# Patient Record
(demographics unavailable — no encounter records)

---

## 2024-10-09 NOTE — DATA REVIEWED
[FreeTextEntry1] : CT of head 09/11/2024 SB CT cervical spine 09/11/2024 SB  CT Thoracic and lumbar spine 09/11/2024 SB MRI of the cervical spine 06/12/2024 [facility] MRI of the thoracic spine 06/12/2024 [facility] MRI of the lumbar spine 06/12/2024 [facility] MRI of the brain pre and post IV contrast 06/12/2024 [facility]

## 2024-10-09 NOTE — END OF VISIT
[FreeTextEntry3] :  The following information has been documented by Parvin Pavon acting as a scribe. The documentation recorded by the scribe, in my presence, accurately reflects the service I, Dr. Stevenson, personally performed, and the decisions made by me with my edits as appropriate.  This note was generated by using Dragon medical dictation software. A reasonable effort has been made for proofreading its contents, but typos may still remain. If there are any questions or points of clarification needed, please notify my office.

## 2024-10-09 NOTE — PHYSICAL EXAM
[Normal] : Heart rate was normal and rhythm regular, normal S1 and S2, no gallops, no murmurs and no pericardial rub [FreeTextEntry1] : EXAMINATION OF THE CERVICAL SPINE AND UPPER EXTREMITIES: Pt is aware and alert and answered all questions cooperatively.  Cranial nerve testing was intact.   Smell and taste were not tested.   Visual fields were full.   There was no difficulty with finger to nose response.   Rhomberg testing was negative.   There was no dysmetria of the upper extremities.  Reflexes revealed 2's and symmetrical    Manual muscle testing of the right upper extremity revealed  Sensory examination revealed sensation was intact.  Vibratory and proprioceptive testing were intact.   Peripheral pulses were palpable bilaterally.   Francisco Test was negative.   Tinels Test was negative at the wrists bilaterally.   The Spurling Cervical Compression Test was negative.   The Adsons Maneuver was negative bilaterally.   No scapular winging was noted.   There was good scapular mobility.    Range of motion testing was performed with the use of a goniometer.  On range of motion testing, flexion was to 40 degrees  (normal - 45), extension was to 10 degrees (normal - 55), right rotation was to 40 degrees  (normal - 70), left rotation was to 35 degrees (normal - 70), right lateral bending was to 15 degrees (normal - 40), and left lateral bending was to 10 degrees (normal - 40).  On palpation, of the cervical spine there was tenderness involving numerous cervical spinous processes. Tenderness and spasm involving the bilateral cervical paraspinal musculature. Tenderness involving the bilateral occiputs. Trigger points involving the bilateral trapezii musculature.   EXAMINATION OF LUMBAR SPINE & LOWER EXTREMITIES: Upon inspection: there is a mild exaggeration of the thoracic kyphosis. Straightening of the lumbar lordosis.   Pt ambulates with rollator walker   Reflexes (R) L/E:                   Quadriceps 2                   Achilles 2 Reflexes (L) L/E:                    Quadriceps 2                    Achilles 2    Sensory L/E: decrease sensation of the stocking distribution of the bilateral feet      Good peripheral Pulses Bilateral L/E   Testing: Patricks (R) [- ]               Patricks (L) [ -]               Babinski [ down going bilaterally]               Chaddock [ -bilaterally]               Oppenheim [ -bilaterally]               Gonda [- bilaterally]               Clonus [- ankle bilaterally]               Leseagues (R) [- ]               Leseagues (L) [ -]               Kemps [ -] SI Jt Lig.Challenege Test (R) + SI Jt Lig.Challenege Test (L) +  S.L.R. ( R ) + 40 degrees S.L.R. ( L ) + 40 degrees Range of motion testing was performed with the use of a goniometer.                           Flexion: 55 degrees (normal - 75-90)                           Extension: 10 degrees (normal - 30)                           Lateral Bending ( R ): 25 degrees  (normal - 35)                           Lateral Bending ( L ):20 degrees (normal - 35)                           Thoracic Rotation ( R ):25 degrees (normal - 35)                           Thoracic Rotation ( L ): 30 degrees (normal - 35)                           Internal Rotation Femur ( R ): mild restriction                            External Rotation Femur ( R ):WNL                           Internal Rotation Femur ( L ):mild restriction                            External Rotation Femur ( L ): WNL  Vibratory: intact left lower extremity. Difficulty involving the lower extremity  Proprioception: intact left lower extremity. Difficulty involving the right lower extremity.  MMT: grossly intact  Palpation of the Lumbar Spine: Tenderness involving the L4/L5 and L5/S1 interspace. Tenderness involving the bilateral SI JT'S. Tenderness and spasm involving the bilateral lower lumbar paraspinal musculature. Trigger points involving the bilateral gluteal musculature.

## 2024-10-09 NOTE — ASSESSMENT
[FreeTextEntry1] : Acupuncture 10 Sessions to C/S & L/S  Goals of which are to decrease pain, dissipate muscle spasm, increase ROM and improve level of function. Home exercise plan reviewed with patient. Stressed the importance for the need for frequent change in position from sit to stand and stand to sit, as well as use of weight shifting techniques to alleviate low back discomfort. Instruction in proper posturing, body mechanics and lifting techniques. Follow up with Neurologist for peripheral neuropathy  Follow up with EDYTA Bryant in 4 to 6 weeks  At least 60 minutes was spent with patient face to face examining patient, reviewing findings/results, counseling patient and coordinating treatment program. Ample time was provided to answer any questions or address concerns to the patient's satisfaction.

## 2024-10-24 NOTE — HISTORY OF PRESENT ILLNESS
[Gradual] : gradual [Localized] : localized [Nothing helps with pain getting better] : Nothing helps with pain getting better [5] : 5 [Burning] : burning [Tingling] : tingling [de-identified] : Patient presents for follow up of her right ankle arthritis. She reports progressive right ankle pain over several years.  She is s/p orif of a trimalleolar fracture in 1994.  She had her hardware removed shortly after. She ambulates with a walker due to peripheral neuropathy. Steroid injection right ankle joint 8/8/24 has helped. Mobic 15 mg helpful. She uses an ankle brace prn. She reports her neuropathy has been getting worse. Has been using OTC lidocaine and has increased her cymbalta.  [] : no [FreeTextEntry1] : rt ankle [FreeTextEntry5] : pain for 6 weeks nut did have tri mall 30 years ago and still has permenant numbness [FreeTextEntry6] : redness,numbness  [FreeTextEntry9] : meloxicam

## 2024-10-24 NOTE — DISCUSSION/SUMMARY
[de-identified] : Pt. is doing well in regard to her ankle.   WB in supportive footwear. Ice to affected area.  Discussed role and timing of steroid injection/gel injections (visco-3).   She will follow up with neurology in regard to neuropathy.

## 2024-10-24 NOTE — PHYSICAL EXAM
[NL 30)] : inversion 30 degrees [5___] : eversion 5[unfilled]/5 [2+] : posterior tibialis pulse: 2+ [Right] : right ankle [Weight -] : weightbearing [FreeTextEntry9] : Moderate djd tibiotalar joint.  No significant deformity. [NL (40)] : plantar flexion 40 degrees [] : no pain when stressing lateral tarsal metatarsal joint [de-identified] : Absence of sensation forefoot.  [TWNoteComboBox7] : dorsiflexion 15 degrees [de-identified] : eversion 10 degrees

## 2024-10-29 NOTE — REASON FOR VISIT
[Follow-Up Visit] : a follow-up pain management visit [Family Member] : family member [Other: _____] : [unfilled] [FreeTextEntry2] : Low back/bilateral leg pain

## 2024-10-29 NOTE — HISTORY OF PRESENT ILLNESS
[Lower back] : lower back [8] : 8 [7] : 7 [Burning] : burning [Sharp] : sharp [Tingling] : tingling [Constant] : constant [Household chores] : household chores [Leisure] : leisure [Sleep] : sleep [Meds] : meds [Standing] : standing [Retired] : Work status: retired [FreeTextEntry1] : 10/25/2024 - Patient presents for a FUV after a bilateral occipital nerve block with U/S guidance on 2024. Patient reports mild improvement in her headaches at this time however, she has been addressing her cervical spine and finds that has been contributing to her relief.  She was seen by neurology who determined the HAs were generating from her neck and she now has been in physical therapy 3 times/week for the past 14 weeks and will be initiating acupuncture in the next few weeks as well. Of note, patient established care with a new cardiologist and is now on a new medication regimen and reports better controlled hypertension.  Today. her most bothersome symptom is her low back pain with radiation to bilateral lower extremities. She reports burning, numbness and tingling to bilateral feet. Presents today to discuss next steps in her treatment plan for her low back pain with the hope of decreasing her pain and increasing her functionality and quality of life.   2024 - Patient presents for FUV after a bilateral occipital nerve block on 2024. Patient reports a significant reduction in the frequency and intensity of her headaches for about 3-4 days s/p occipital block.  Unfortunately she experienced a return of symptoms essentially to baseline.  She does continue to have headaches from which she thinks could possibly related to uncontrolled hypertension.  She has been seeing cardiology and will follow up with neurology.  2024 - Patient presents for FUV after a L5-S1 LESI on 2024. Patient reports moderate reduction of her lower back and right leg pain following the epidural.  Unfortunately, patient was seen in the ER 3 days post procedure for what she describes as severe hypertension and ongoing headache.  She was evaluated and discharged home.  Patient has been dealing with headaches for quite some time now without definitive source.  Has seen her longtime neurologist Dr. Kellerman and recently had an evaluation with Dr. Salmon ([facility]) who ordered repeat imaging of the C/T/L spine as well as brain.  Headache severity is greatest at the bilateral occiput with radiation to the top of her head.  2024 - Patient presents for FUV regarding their lower back pain.  She was last seen approximately 6 months ago.  Patient reports sustained relief following her previous lumbar epidural steroid injection which lasted until approximately 1 month ago.  Now has a return of lower back with radiation down the posterior right leg stopping at the posterior calf.  Additionally, she reports a flareup of her peripheral neuropathy as per her treating neurologist.  A course of intravenous corticosteroid was suggested and completed about 5 weeks ago.  Takes Cymbalta 60 mg and Tylenol daily with no benefit; she has discontinued Lyrica due to no perceived benefit.  Of note she is undergoing further neurological workup and will be undergoing a spinal tap this week.  2023 - Patient presents for FUV after a L5-S1 LESI on 2023. Patient reports a complete resolution of pain s/p injection, she is pleased with the results of the epidural; she continues to take 100 mg of Lyrica BID.   10/26/2023 - Patient presents for FUV regarding their lower back pain.   Patient reports radiation to the right lower extremity extending to the foot.  Currently has titrated to a dose of Lyrica 200 mg daily which has reduced some of the "neuropathy sensations". She also complains of additional multifocal joint pain; greatest of which is in her right knee.  She sees Dr. Nguyen for this.  2023- Patient presents for FUV regarding their lower back pain. Patient had started taking Tramadol and felt sick with nausea and sweating days after using, she has since discontinued use; she no longer gets IV steroid infusions as well.  Still with complaints in the low back with radiation to the right lower extremity extending to the ankle/foot.  Worst pain is concentrated in the right lateral aspect of the calf. Greatest degree of difficulty with ambulation.  2023 - Patient presents for FUV regarding their lower back pain.  Patient did not start the [place]  financial constraints.  She is following with a neurologist for IV steroid infusions for tx of peripheral neuropathy.  Patient reports she gets right sided arm numbness/tingling and right foot paranesthesia's.  She is becoming frustrated by her multitude of pain issues.  She is dealing with concurrent neck, low back, knee, and neurological issues.  2023 - Patient presents to review lumbar spine MRI from 2023. The majority of the patients pain is in the right foot, and occasionally the first digit on the left foot, there is no pain in the rest of the lower extremities, she has back pain but it is not as prominent as the right foot pain.  Has seen neurology in the past and obtained EMG.  Planning for follow-up in upcoming week.  2023 - Patient presents for FUV after a L5-S1 LESI on 2023.  Patient reports overall 50% relief sustained following the injection.  Patient reports her pain returned to baseline after about 2 weeks. She complains of primarily axial lower back pain specifically with prolonged standing.  2023 - Patient presents for FUV after a right PM C7-T1 DARRELL on 2023. Patient reports significant pain reduction following the injection (60% reduction in pain).  She is pleased with her response.  Patient's main complaint today is her axial lower back pain that has radiation to the medial right thigh, she had taken a trip to [place] recently for a week where she did extensive walking and exacerbated her back pain; she can endure 5-10 minutes of standing before it becomes unbearable.  Has had previous neurosurgical evaluations and surgery was recommended however she defers.  2023 - Patient presents for reevaluation.  She was last seen approximately 6 months ago.  Patient to the office regarding her neck pain. Patient was referred by Dr. Arevalo. Patient was originally seen for her back in September. Patient was placed on MDP for her right upper extremity radicular pain with good relief. Patient complains of right rotation stiffness in regards to her neck. Patient here for possible interventional management of cervical region ridiculous.  Taking dual action Advil once sometimes twice daily.  2022 - The patient presents for initial evaluation regarding their low back pain. Patient was referred by Dr. Nguyen. Patient sustained a slip and fall in 2021 and landed on her buttocks. Significant flare-up in her lower back since fall.  Experiences numbness in her toes and fingertips get tingling sensation when she closes her fists. Patient has gotten trigger point injections with no relief. Leg symptoms improve when seated. Diclofenac provides relief, previously tried Celebrex and Gabapentin - no relief.   Injections: 1) C7-T1 DARRELL (2023) 2) L5-S1 LESI (2023, 2023, 2024) 3) Bilateral occipital nerve block with U/S guidance - (2024, 2024)   Pertinent Surgical History: N/A   Imagin) MRI Lumbar Spine (2024) - [facility]  2) MRI Thoracic Spine (2024) - [facility]  3) MRI Cervical Spine (2024)- [facility]  Electrodiagnostic Testin) Lower Extremity NCV/EMG - (2022) - [facility]  1. 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 left peroneal motor and the right peroneal motor nerves showed reduced amplitude and decreased conduction velocity. The left tibial motor nerve showed decreased conduction velocity. The right tibial motor nerve showed reduced amplitude. The left sural sensory and right sural sensory nerves were unremarkable.  3) Upper Extremity NCV/EMG - (22) - [facility]  1. very mild bilateral median nerve lesions at the wrists 2. very mild bilateral ulnar nerve lesion at the elbows.   Physician Disclaimer: I have personally reviewed and confirmed all HPI data with the patient.  [] : Post Surgical Visit: no [FreeTextEntry7] : B/L LEGS [FreeTextEntry6] : NUMB [FreeTextEntry9] : Meloxicam  [de-identified] : in one spot  [de-identified] : L MANJINDER C MRI AT [facility]

## 2024-10-29 NOTE — PHYSICAL EXAM
[de-identified] : Constitutional:   - No acute distress   - Obese    Neurological:   - normal mood and affect   - alert and oriented x 3       Cardiovascular:   - grossly normal  Lumbar Spine Exam:  Inspection: erythema (-) ecchymosis (-) rashes (-) alignment: no scoliosis  Palpation: paraspinal tenderness:                    L (-) ; R (-) thoracic paraspinal tenderness:       L (-) ; R (-) sciatic nerve tenderness :                L (-) ; R (-) SI joint tenderness:                         L (-) ; R (-) GTB tenderness:                             L (-); R (-)  ROM: WNL with mild stiffness Pain with extremes of extension and flexion  Strength: Right/Left Hip Flexion: (5/5) (5/5) Quadriceps: (5/5) (5/5) Hamstrings: (5/5) (5/5) Ankle Dorsiflexion: (5/5) (5/5) EHL: (5/5) (5/5) Ankle Plantarflexion: (5/5) (5/5)  Special Tests: SLR:                 R (=) ; L (=) Facet loading:  R (+) ; L (+) WILLIAN test:     R (n/a) ; L (n/a) Tight Hamstrings R (+) ; L (+)  Neurologic: Light touch intact throughout LE  Reflexes normal and symmetric  Gait: antalgic gait, unsteady  ambulates with assistance of walker  ------------------------------------------------------------------------------ Cervical Spine Exam:   Inspection:   erythema (-)   ecchymosis (-)   rashes (-)    Palpation:                                                    Cervical paraspinal tenderness:         R (+); L(+)  Upper trapezius tenderness:              R (-); L (-)  Rhomboids tenderness:                      R (-); L (-)  Occipital Ridge:                                   R (+); L (+)  Supraspinatus tenderness:                 R (-); L (-)   ROM:   Reduced ROM all planes; crepitus throughout ROM testing pain throughout range of motion testing  Strength Testing:              Deltoid                           R (5/5); L (5/5)  Biceps:                          R (5/5); L (5/5)  Triceps:                         R (5/5); L (5/5)  Finger Abductors:         R (5/5); L (5/5)  Grasp:                           R (5/5); L (5/5)   Special Testing:  Spurling Test:                  R (-); L (-)  Facet load test:               R (+); L (+)   Neuro:  SILT throughout right upper extremity  SILT throughout left upper extremity   Reflexes:  Biceps   -           R (2+); L (2+)  Triceps  -           R (2+); L (2+)  Brachioradialis- R (2+); L (2+)     No ankle clonus

## 2024-10-29 NOTE — ASSESSMENT
[FreeTextEntry1] : A discussion regarding available pain management treatment options occurred with the patient. These included interventional, rehabilitative, pharmacological, and alternative modalities. We will proceed with the following:  Interventional treatment options: - Proceed with L5-S1 LESI (80 mg Kenalog) with fluoroscopic guidance - Patient may remain on aspirin for indicated procedure - Previously discussed expectations of interventional pain therapies in the setting of multilevel central canal stenosis - see additional instructions below  Rehabilitative options: - continue physical therapy for cervical spine - Failed physical therapy trial in the past regarding lumbar spine; she defers further - encouraged participation in active HEP as tolerated  Medication based treatment options: - continue Tylenol 500-1000 mg up to TID on PRN basis - Continue meloxicam 7.5-15 mg daily as needed; counseled patient to discuss consistent NSAID use with her cardiologist - Continue Cymbalta 90 mg daily, prescribed by neurology - Failed previous trials of Lyrica and gabapentin - see additional instructions below  Complementary treatment options: - Weight management and lifestyle modifications discussed  Additional treatment recommendations as follows: - Previously discussed orthopedic evaluation for lumbar decompression; she defers given caretaker responsibilities for her  - Follow-up with neurology as directed - Follow up 1-2 weeks post injection for assessment of efficacy and further treatment recommendations  The risks, benefits and alternatives of the proposed procedure were explained in detail with the patient. The risks outlined include but are not limited to infection, bleeding, nerve injury, a temporary increase in pain, failure to resolve symptoms, need for future interventions, allergic reaction, and possible elevation of blood sugar in diabetics if using corticosteroid.  All questions were answered to patient's apparent satisfaction, and he/she verbalized an understanding.  We have discussed the risks, benefits, and alternatives NSAID therapy including but not limited to the risk of bleeding, thrombosis, gastric mucosal irritation/ulceration, allergic reaction and kidney dysfunction; the patient verbalizes an understanding.  ERMA, Deepika Ponce NP, acting as scribe, attest that this documentation has been prepared under the direction and in the presence of Provider Alverto Barney DO.    The documentation recorded by the scribe, in my presence, accurately reflects the service I personally performed, and the decisions made by me with my edits as appropriate.

## 2024-10-29 NOTE — PHYSICAL EXAM
[de-identified] : Constitutional:   - No acute distress   - Obese    Neurological:   - normal mood and affect   - alert and oriented x 3       Cardiovascular:   - grossly normal  Lumbar Spine Exam:  Inspection: erythema (-) ecchymosis (-) rashes (-) alignment: no scoliosis  Palpation: paraspinal tenderness:                    L (-) ; R (-) thoracic paraspinal tenderness:       L (-) ; R (-) sciatic nerve tenderness :                L (-) ; R (-) SI joint tenderness:                         L (-) ; R (-) GTB tenderness:                             L (-); R (-)  ROM: WNL with mild stiffness Pain with extremes of extension and flexion  Strength: Right/Left Hip Flexion: (5/5) (5/5) Quadriceps: (5/5) (5/5) Hamstrings: (5/5) (5/5) Ankle Dorsiflexion: (5/5) (5/5) EHL: (5/5) (5/5) Ankle Plantarflexion: (5/5) (5/5)  Special Tests: SLR:                 R (=) ; L (=) Facet loading:  R (+) ; L (+) WILLIAN test:     R (n/a) ; L (n/a) Tight Hamstrings R (+) ; L (+)  Neurologic: Light touch intact throughout LE  Reflexes normal and symmetric  Gait: antalgic gait, unsteady  ambulates with assistance of walker  ------------------------------------------------------------------------------ Cervical Spine Exam:   Inspection:   erythema (-)   ecchymosis (-)   rashes (-)    Palpation:                                                    Cervical paraspinal tenderness:         R (+); L(+)  Upper trapezius tenderness:              R (-); L (-)  Rhomboids tenderness:                      R (-); L (-)  Occipital Ridge:                                   R (+); L (+)  Supraspinatus tenderness:                 R (-); L (-)   ROM:   Reduced ROM all planes; crepitus throughout ROM testing pain throughout range of motion testing  Strength Testing:              Deltoid                           R (5/5); L (5/5)  Biceps:                          R (5/5); L (5/5)  Triceps:                         R (5/5); L (5/5)  Finger Abductors:         R (5/5); L (5/5)  Grasp:                           R (5/5); L (5/5)   Special Testing:  Spurling Test:                  R (-); L (-)  Facet load test:               R (+); L (+)   Neuro:  SILT throughout right upper extremity  SILT throughout left upper extremity   Reflexes:  Biceps   -           R (2+); L (2+)  Triceps  -           R (2+); L (2+)  Brachioradialis- R (2+); L (2+)     No ankle clonus

## 2024-10-29 NOTE — HISTORY OF PRESENT ILLNESS
[Lower back] : lower back [8] : 8 [7] : 7 [Burning] : burning [Sharp] : sharp [Tingling] : tingling [Constant] : constant [Household chores] : household chores [Leisure] : leisure [Sleep] : sleep [Meds] : meds [Standing] : standing [Retired] : Work status: retired [FreeTextEntry1] : 10/25/2024 - Patient presents for a FUV after a bilateral occipital nerve block with U/S guidance on 2024. Patient reports mild improvement in her headaches at this time however, she has been addressing her cervical spine and finds that has been contributing to her relief.  She was seen by neurology who determined the HAs were generating from her neck and she now has been in physical therapy 3 times/week for the past 14 weeks and will be initiating acupuncture in the next few weeks as well. Of note, patient established care with a new cardiologist and is now on a new medication regimen and reports better controlled hypertension.  Today. her most bothersome symptom is her low back pain with radiation to bilateral lower extremities. She reports burning, numbness and tingling to bilateral feet. Presents today to discuss next steps in her treatment plan for her low back pain with the hope of decreasing her pain and increasing her functionality and quality of life.   2024 - Patient presents for FUV after a bilateral occipital nerve block on 2024. Patient reports a significant reduction in the frequency and intensity of her headaches for about 3-4 days s/p occipital block.  Unfortunately she experienced a return of symptoms essentially to baseline.  She does continue to have headaches from which she thinks could possibly related to uncontrolled hypertension.  She has been seeing cardiology and will follow up with neurology.  2024 - Patient presents for FUV after a L5-S1 LESI on 2024. Patient reports moderate reduction of her lower back and right leg pain following the epidural.  Unfortunately, patient was seen in the ER 3 days post procedure for what she describes as severe hypertension and ongoing headache.  She was evaluated and discharged home.  Patient has been dealing with headaches for quite some time now without definitive source.  Has seen her longtime neurologist Dr. Kellerman and recently had an evaluation with Dr. Salmon ([facility]) who ordered repeat imaging of the C/T/L spine as well as brain.  Headache severity is greatest at the bilateral occiput with radiation to the top of her head.  2024 - Patient presents for FUV regarding their lower back pain.  She was last seen approximately 6 months ago.  Patient reports sustained relief following her previous lumbar epidural steroid injection which lasted until approximately 1 month ago.  Now has a return of lower back with radiation down the posterior right leg stopping at the posterior calf.  Additionally, she reports a flareup of her peripheral neuropathy as per her treating neurologist.  A course of intravenous corticosteroid was suggested and completed about 5 weeks ago.  Takes Cymbalta 60 mg and Tylenol daily with no benefit; she has discontinued Lyrica due to no perceived benefit.  Of note she is undergoing further neurological workup and will be undergoing a spinal tap this week.  2023 - Patient presents for FUV after a L5-S1 LESI on 2023. Patient reports a complete resolution of pain s/p injection, she is pleased with the results of the epidural; she continues to take 100 mg of Lyrica BID.   10/26/2023 - Patient presents for FUV regarding their lower back pain.   Patient reports radiation to the right lower extremity extending to the foot.  Currently has titrated to a dose of Lyrica 200 mg daily which has reduced some of the "neuropathy sensations". She also complains of additional multifocal joint pain; greatest of which is in her right knee.  She sees Dr. Nguyen for this.  2023- Patient presents for FUV regarding their lower back pain. Patient had started taking Tramadol and felt sick with nausea and sweating days after using, she has since discontinued use; she no longer gets IV steroid infusions as well.  Still with complaints in the low back with radiation to the right lower extremity extending to the ankle/foot.  Worst pain is concentrated in the right lateral aspect of the calf. Greatest degree of difficulty with ambulation.  2023 - Patient presents for FUV regarding their lower back pain.  Patient did not start the Gralise  financial constraints.  She is following with a neurologist for IV steroid infusions for tx of peripheral neuropathy.  Patient reports she gets right sided arm numbness/tingling and right foot paranesthesia's.  She is becoming frustrated by her multitude of pain issues.  She is dealing with concurrent neck, low back, knee, and neurological issues.  2023 - Patient presents to review lumbar spine MRI from 2023. The majority of the patients pain is in the right foot, and occasionally the first digit on the left foot, there is no pain in the rest of the lower extremities, she has back pain but it is not as prominent as the right foot pain.  Has seen neurology in the past and obtained EMG.  Planning for follow-up in upcoming week.  2023 - Patient presents for FUV after a L5-S1 LESI on 2023.  Patient reports overall 50% relief sustained following the injection.  Patient reports her pain returned to baseline after about 2 weeks. She complains of primarily axial lower back pain specifically with prolonged standing.  2023 - Patient presents for FUV after a right PM C7-T1 DARRELL on 2023. Patient reports significant pain reduction following the injection (60% reduction in pain).  She is pleased with her response.  Patient's main complaint today is her axial lower back pain that has radiation to the medial right thigh, she had taken a trip to [place] recently for a week where she did extensive walking and exacerbated her back pain; she can endure 5-10 minutes of standing before it becomes unbearable.  Has had previous neurosurgical evaluations and surgery was recommended however she defers.  2023 - Patient presents for reevaluation.  She was last seen approximately 6 months ago.  Patient to the office regarding her neck pain. Patient was referred by Dr. Arevalo. Patient was originally seen for her back in September. Patient was placed on MDP for her right upper extremity radicular pain with good relief. Patient complains of right rotation stiffness in regards to her neck. Patient here for possible interventional management of cervical region ridiculous.  Taking dual action Advil once sometimes twice daily.  2022 - The patient presents for initial evaluation regarding their low back pain. Patient was referred by Dr. Nguyen. Patient sustained a slip and fall in 2021 and landed on her buttocks. Significant flare-up in her lower back since fall.  Experiences numbness in her toes and fingertips get tingling sensation when she closes her fists. Patient has gotten trigger point injections with no relief. Leg symptoms improve when seated. Diclofenac provides relief, previously tried Celebrex and Gabapentin - no relief.   Injections: 1) C7-T1 DARRELL (2023) 2) L5-S1 LESI (2023, 2023, 2024) 3) Bilateral occipital nerve block with U/S guidance - (2024, 2024)   Pertinent Surgical History: N/A   Imagin) MRI Lumbar Spine (2024) - [facility]  2) MRI Thoracic Spine (2024) - [facility]  3) MRI Cervical Spine (2024)- [facility]  Electrodiagnostic Testin) Lower Extremity NCV/EMG - (2022) - [facility]  1. 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 left peroneal motor and the right peroneal motor nerves showed reduced amplitude and decreased conduction velocity. The left tibial motor nerve showed decreased conduction velocity. The right tibial motor nerve showed reduced amplitude. The left sural sensory and right sural sensory nerves were unremarkable.  3) Upper Extremity NCV/EMG - (22) - [facility]  1. very mild bilateral median nerve lesions at the wrists 2. very mild bilateral ulnar nerve lesion at the elbows.   Physician Disclaimer: I have personally reviewed and confirmed all HPI data with the patient.  [] : Post Surgical Visit: no [FreeTextEntry6] : NUMB [FreeTextEntry7] : B/L LEGS [de-identified] : in one spot  [FreeTextEntry9] : Meloxicam  [de-identified] : L [facility]ER C MRI AT [facility]

## 2024-11-01 NOTE — PROCEDURE
[de-identified] : Acupuncture treatment: Baldry technique with multiple needle placement to the lumbar paraspinal and gluteal musculature with UV lamp x 45 minutes Paralumbar chain (bladder meridian) with ES x 45 minutes K3-HT3 SI4-BL60 with ES x 45 minutes BL 10, SP 6, ST 36, LR 3, LI 4, BL 59 Patient tolerated procedure well

## 2024-11-03 NOTE — DISCUSSION/SUMMARY
[de-identified] : The natural progression of osteoarthritis was explained to the patient. Dx is OA in bilateral knees and right hand. We discussed the possible treatment options from conservative to operative.  These included NSAIDS, Glucosamine and Chondrotin sulfate, and Physical Therapy as well different types of injections.  We also discussed that at some point they may progress to needed a TKA, not needed at this time. Information and pamphlets were given.  1. Patient states she has pain in her CMC joint of RT Thumb, wants to do gel injections. Informed patient it's not guaranteed this treatment will work. Appario program submitted. 2. Patient is doing acupuncture to help with pain.  3. Patient explains multiple medical issues, issues with family, and quality of life. Recommended seeing a therapist or a psychiatrist. 4. Patient states taking Meloxicam but avoids it due to kidney and liver risks. Advised her to take 15 mg every day to help with pain. 5. Patient states she is interested in moving forward with TKA at the beginning of the year. Patient no longer wants to move forward with this course since she would be referred to a different Dr. Patient would like to move forward with gel inj instead.  5. Appario program submitted for bilateral knee.   Follow up with Appario Program completion     Dx / Natural History: The patient was advised of the diagnosis.  The natural history of the pathology was explained in full to the patient in layman's terms.  Several different treatment options were discussed and explained in full to the patient including the risks and benefits of both surgical and non-surgical treatments.  All questions and concerns were answered.   Pain Guide Activities: The patient was advised to let pain guide the gradual advancement of activities.  Physical Therapy: The patient was provided with a prescription for Physical Therapy.  Icing: The patient was advised to apply ice (wrapped in a towel or protective covering) to the area daily (20 minutes at a time, 2-4X/day).

## 2024-11-03 NOTE — IMAGING
[Right] : right hand [Degenerative change] : Degenerative change [de-identified] : Constitutional: The patient appears well developed, well nourished. Skin: No impressive skin lesions present, except as noted in detailed exam. Lymphatic: No palpable lymphadenopathy in examined body areas. Neurologic: Alert and oriented to time, place and person.   BILATERAL KNEE:  Inspection: mild effusion Palpation: medial joint line tenderness, anterior tenderness Knee Range of Motion: 3-125 Strength: 5/5 Quadriceps strength, 5/5 Hamstring strength Neurological: light touch is intact throughout Ligament Stability and Special Tests: McMurrays: neg Lachman: neg Pivot Shift: neg Posterior Drawer: neg Valgus: neg Varus: neg Patella Apprehension: neg Patella Maltracking: neg   LEFT WRIST and HAND Inspection: No erythema, No swelling  Palpation: TTP over CMC Range of Motion: Full range of motion. Strength: normal Neurological testing: motor exam 5/5 and light touch intact.  Special Testing:

## 2024-11-03 NOTE — HISTORY OF PRESENT ILLNESS
[de-identified] : 10/28/24: Pt here today for follow-up for bilateral knee pain and right hand pain. Pain and symptoms are better overall. Since last visit patient received euflexxa gel series, which she had some relief for 3 months. Today c/o pain in the back of the knee and difficulty w/ knee flexion.    8/12/24: Here for Euflexxa inj #3 B/L knees  8/5/24: Here for Euflexxa inj #2 B/L knees  7/29/24: Here for Euflexxa #1 bilateral knees  7/15/24: Here for follow up of B/L knees. Continues to have pain in knees. Currently using a walker for ambulation.

## 2024-11-03 NOTE — DISCUSSION/SUMMARY
[de-identified] : The natural progression of osteoarthritis was explained to the patient. Dx is OA in bilateral knees and right hand. We discussed the possible treatment options from conservative to operative.  These included NSAIDS, Glucosamine and Chondrotin sulfate, and Physical Therapy as well different types of injections.  We also discussed that at some point they may progress to needed a TKA, not needed at this time. Information and pamphlets were given.  1. Patient states she has pain in her CMC joint of RT Thumb, wants to do gel injections. Informed patient it's not guaranteed this treatment will work. Appario program submitted. 2. Patient is doing acupuncture to help with pain.  3. Patient explains multiple medical issues, issues with family, and quality of life. Recommended seeing a therapist or a psychiatrist. 4. Patient states taking Meloxicam but avoids it due to kidney and liver risks. Advised her to take 15 mg every day to help with pain. 5. Patient states she is interested in moving forward with TKA at the beginning of the year. Patient no longer wants to move forward with this course since she would be referred to a different Dr. Patient would like to move forward with gel inj instead.  5. Appario program submitted for bilateral knee.   Follow up with Appario Program completion     Dx / Natural History: The patient was advised of the diagnosis.  The natural history of the pathology was explained in full to the patient in layman's terms.  Several different treatment options were discussed and explained in full to the patient including the risks and benefits of both surgical and non-surgical treatments.  All questions and concerns were answered.   Pain Guide Activities: The patient was advised to let pain guide the gradual advancement of activities.  Physical Therapy: The patient was provided with a prescription for Physical Therapy.  Icing: The patient was advised to apply ice (wrapped in a towel or protective covering) to the area daily (20 minutes at a time, 2-4X/day).

## 2024-11-03 NOTE — HISTORY OF PRESENT ILLNESS
[de-identified] : 10/28/24: Pt here today for follow-up for bilateral knee pain and right hand pain. Pain and symptoms are better overall. Since last visit patient received euflexxa gel series, which she had some relief for 3 months. Today c/o pain in the back of the knee and difficulty w/ knee flexion.    8/12/24: Here for Euflexxa inj #3 B/L knees  8/5/24: Here for Euflexxa inj #2 B/L knees  7/29/24: Here for Euflexxa #1 bilateral knees  7/15/24: Here for follow up of B/L knees. Continues to have pain in knees. Currently using a walker for ambulation.

## 2024-11-03 NOTE — IMAGING
[Right] : right hand [Degenerative change] : Degenerative change [de-identified] : Constitutional: The patient appears well developed, well nourished. Skin: No impressive skin lesions present, except as noted in detailed exam. Lymphatic: No palpable lymphadenopathy in examined body areas. Neurologic: Alert and oriented to time, place and person.   BILATERAL KNEE:  Inspection: mild effusion Palpation: medial joint line tenderness, anterior tenderness Knee Range of Motion: 3-125 Strength: 5/5 Quadriceps strength, 5/5 Hamstring strength Neurological: light touch is intact throughout Ligament Stability and Special Tests: McMurrays: neg Lachman: neg Pivot Shift: neg Posterior Drawer: neg Valgus: neg Varus: neg Patella Apprehension: neg Patella Maltracking: neg   LEFT WRIST and HAND Inspection: No erythema, No swelling  Palpation: TTP over CMC Range of Motion: Full range of motion. Strength: normal Neurological testing: motor exam 5/5 and light touch intact.  Special Testing:

## 2024-11-05 NOTE — PROCEDURE
[de-identified] : Acupuncture treatment: Baldry technique with multiple needle placement to the lumbar paraspinal and gluteal musculature with UV lamp x 45 minutes Paralumbar chain (bladder meridian) with ES x 45 minutes K3-HT3 SI4-BL60 with ES x 45 minutes BL 10, SP 6, ST 36, LR 3, LI 4, BL 59 Patient tolerated procedure well

## 2024-11-15 NOTE — PROCEDURE
[de-identified] : Acupuncture treatment: Baldry technique with multiple needle placement to the lumbar paraspinal and gluteal musculature with UV lamp x 45 minutes Paralumbar chain (bladder meridian) with ES x 45 minutes K3-HT3 SI4-BL60 with ES x 45 minutes BL 10, SP 6, ST 36, LR 3, LI 4, BL 59 Patient tolerated procedure well

## 2024-11-15 NOTE — PROCEDURE
[FreeTextEntry3] : Date of Service: 11/15/2024   Account: [id number]  Patient: ISMAEL WALDEN   YOB: 1950  Age: 74 year  Surgeon:      Alverto Barney DO  Assistant:    None  Pre-Operative Diagnosis:         Lumbosacral Radiculitis (M54.17)  Post Operative Diagnosis:       Lumbosacral Radiculitis (M54.17)     Procedure:             Lumbar interlaminar (L5-S1) epidural steroid injection under fluoroscopic guidance  Anesthesia:            MAC  This procedure was carried out using fluoroscopic guidance.  The risks and benefits of the procedure were discussed extensively with the patient.  The consent of the patient was obtained and the following procedure was performed. A timeout was performed with all essential staff present and the site and side were verified.  The patient was placed in the prone position with a pillow under the abdomen to minimize the lumbar lordosis.  The lumbar area was prepped and draped in a sterile fashion.  Under A/P view with slight cephalad-caudad angulation, the L5-S1 interspace was identified and marked.  Using sterile technique the superficial skin was anesthetized with 1% Lidocaine.  A 20 gauge Tuohy needle was advanced into the epidural space under fluoroscopy using loss of resistance at the L5-S1 level.  After negative aspiration for heme or CSF, an epidurogram was obtained in the A/P and lateral fluoroscopic views using 2-3 cc of Omnipaque contrast confirming epidural placement of the needle.  After this, 5 cc of a mixture of preservative free normal saline and 80 mg of Kenalog were injected into the epidural space.   Vital signs remained normal throughout the procedure.  The patient tolerated the procedure well.  There were no immediate complications from the performed procedure.  The patient was instructed to apply ice over the injection sites for twenty minutes every two hours for the next 24 hours.  Disposition:      1. The patient was advised to F/U in 1-2 weeks to assess the response to the injection.      2. The patient was also instructed to contact me immediately if there were any concerns related to the procedure performed.

## 2024-11-22 NOTE — PROCEDURE
[de-identified] : Acupuncture treatment: Baldry technique with multiple needle placement to the lumbar paraspinal and gluteal musculature with UV lamp x 45 minutes Paralumbar chain (bladder meridian) with ES x 45 minutes K3-HT3 SI4-BL60 with ES x 45 minutes BL 10, SP 6, ST 36, LR 3, LI 4, BL 59 Patient tolerated procedure well

## 2024-11-29 NOTE — PROCEDURE
[de-identified] : Acupuncture treatment: Baldry technique with multiple needle placement to the lumbar paraspinal and gluteal musculature with UV lamp x 45 minutes Paralumbar chain (bladder meridian) with ES x 45 minutes K3-HT3 SI4-BL60 with ES x 45 minutes BL 10, SP 6, ST 36, LR 3, LI 4, BL 59 Patient tolerated procedure well

## 2024-12-03 NOTE — HISTORY OF PRESENT ILLNESS
[Lower back] : lower back [4] : 4 [2] : 2 [Dull/Aching] : dull/aching [Intermittent] : intermittent [Household chores] : household chores [Leisure] : leisure [Injection therapy] : injection therapy [Standing] : standing [Walking] : walking [Retired] : Work status: retired [FreeTextEntry1] : 12/3/2024 - Patient presents for FUV after a L5-S1 LESI on 11/15/2024. Patient reports 65% relief of symptoms and improvement in functionality.  She reports she has not required medication since the procedure and is pleased with relief in her low back pain. She has been receiving acupuncture as well and finds that has provided significant relief to her low back pain.    Today however, her most bothersome area of pain today is her cervical pain and headaches.  She is currently undergoing treatment for vitamin B12 and vitamin D deficiencies.  She is interested in scheduling a repeat DARRELL with the hope of decreasing her pain and increasing her functionality and quality of life.   10/25/2024 - Patient presents for a FUV after a bilateral occipital nerve block with U/S guidance on 2024. Patient reports mild improvement in her headaches at this time however, she has been addressing her cervical spine and finds that has been contributing to her relief.  She was seen by neurology who determined the HAs were generating from her neck and she now has been in physical therapy 3 times/week for the past 14 weeks and will be initiating acupuncture in the next few weeks as well. Of note, patient established care with a new cardiologist and is now on a new medication regimen and reports better controlled hypertension.  Today. her most bothersome symptom is her low back pain with radiation to bilateral lower extremities. She reports burning, numbness and tingling to bilateral feet. Presents today to discuss next steps in her treatment plan for her low back pain with the hope of decreasing her pain and increasing her functionality and quality of life.   2024 - Patient presents for FUV after a bilateral occipital nerve block on 2024. Patient reports a significant reduction in the frequency and intensity of her headaches for about 3-4 days s/p occipital block.  Unfortunately, she experienced a return of symptoms essentially to baseline.  She does continue to have headaches from which she thinks could possibly related to uncontrolled hypertension.  She has been seeing cardiology and will follow up with neurology.  2024 - Patient presents for FUV after a L5-S1 LESI on 2024. Patient reports moderate reduction of her lower back and right leg pain following the epidural.  Unfortunately, patient was seen in the ER 3 days post procedure for what she describes as severe hypertension and ongoing headache.  She was evaluated and discharged home.  Patient has been dealing with headaches for quite some time now without definitive source.  Has seen her longtime neurologist Dr. Kellerman and recently had an evaluation with Dr. Salmon ([facility]) who ordered repeat imaging of the C/T/L spine as well as brain.  Headache severity is greatest at the bilateral occiput with radiation to the top of her head.  2024 - Patient presents for FUV regarding their lower back pain.  She was last seen approximately 6 months ago.  Patient reports sustained relief following her previous lumbar epidural steroid injection which lasted until approximately 1 month ago.  Now has a return of lower back with radiation down the posterior right leg stopping at the posterior calf.  Additionally, she reports a flareup of her peripheral neuropathy as per her treating neurologist.  A course of intravenous corticosteroid was suggested and completed about 5 weeks ago.  Takes Cymbalta 60 mg and Tylenol daily with no benefit; she has discontinued Lyrica due to no perceived benefit.  Of note she is undergoing further neurological workup and will be undergoing a spinal tap this week.  2023 - Patient presents for FUV after a L5-S1 LESI on 2023. Patient reports a complete resolution of pain s/p injection, she is pleased with the results of the epidural; she continues to take 100 mg of Lyrica BID.   10/26/2023 - Patient presents for FUV regarding their lower back pain.   Patient reports radiation to the right lower extremity extending to the foot.  Currently has titrated to a dose of Lyrica 200 mg daily which has reduced some of the "neuropathy sensations". She also complains of additional multifocal joint pain; greatest of which is in her right knee.  She sees Dr. Nguyen for this.  2023- Patient presents for FUV regarding their lower back pain. Patient had started taking Tramadol and felt sick with nausea and sweating days after using, she has since discontinued use; she no longer gets IV steroid infusions as well.  Still with complaints in the low back with radiation to the right lower extremity extending to the ankle/foot.  Worst pain is concentrated in the right lateral aspect of the calf. Greatest degree of difficulty with ambulation.  2023 - Patient presents for FUV regarding their lower back pain.  Patient did not start the Gralise 2/ financial constraints.  She is following with a neurologist for IV steroid infusions for tx of peripheral neuropathy.  Patient reports she gets right sided arm numbness/tingling and right foot paranesthesia's.  She is becoming frustrated by her multitude of pain issues.  She is dealing with concurrent neck, low back, knee, and neurological issues.  2023 - Patient presents to review lumbar spine MRI from 2023. The majority of the patients pain is in the right foot, and occasionally the first digit on the left foot, there is no pain in the rest of the lower extremities, she has back pain but it is not as prominent as the right foot pain.  Has seen neurology in the past and obtained EMG.  Planning for follow-up in upcoming week.  2023 - Patient presents for FUV after a L5-S1 LESI on 2023.  Patient reports overall 50% relief sustained following the injection.  Patient reports her pain returned to baseline after about 2 weeks. She complains of primarily axial lower back pain specifically with prolonged standing.  2023 - Patient presents for FUV after a right PM C7-T1 DARRELL on 2023. Patient reports significant pain reduction following the injection (60% reduction in pain).  She is pleased with her response.  Patient's main complaint today is her axial lower back pain that has radiation to the medial right thigh, she had taken a trip to [place] recently for a week where she did extensive walking and exacerbated her back pain; she can endure 5-10 minutes of standing before it becomes unbearable.  Has had previous neurosurgical evaluations and surgery was recommended however she defers.  2023 - Patient presents for reevaluation.  She was last seen approximately 6 months ago.  Patient to the office regarding her neck pain. Patient was referred by Dr. Arevalo. Patient was originally seen for her back in September. Patient was placed on MDP for her right upper extremity radicular pain with good relief. Patient complains of right rotation stiffness in regards to her neck. Patient here for possible interventional management of cervical region ridiculous.  Taking dual action Advil once sometimes twice daily.  2022 - The patient presents for initial evaluation regarding their low back pain. Patient was referred by Dr. Nguyen. Patient sustained a slip and fall in 2021 and landed on her buttocks. Significant flare-up in her lower back since fall.  Experiences numbness in her toes and fingertips get tingling sensation when she closes her fists. Patient has gotten trigger point injections with no relief. Leg symptoms improve when seated. Diclofenac provides relief, previously tried Celebrex and Gabapentin - no relief.   Injections: 1) C7-T1 DARRELL (2023) 2) L5-S1 LESI (2023, 2023, 2024, 11/15/2024) 3) Bilateral occipital nerve block with U/S guidance - (2024, 2024)   Pertinent Surgical History: N/A   Imagin) MRI Lumbar Spine (2024) - [facility]  2) MRI Thoracic Spine (2024) - [facility]  3) MRI Cervical Spine (2024)- [facility]  Electrodiagnostic Testin) Lower Extremity NCV/EMG - (2022) - [facility]  1. 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 left peroneal motor and the right peroneal motor nerves showed reduced amplitude and decreased conduction velocity. The left tibial motor nerve showed decreased conduction velocity. The right tibial motor nerve showed reduced amplitude. The left sural sensory and right sural sensory nerves were unremarkable.  3) Upper Extremity NCV/EMG - (22) - [facility]  1. very mild bilateral median nerve lesions at the wrists 2. very mild bilateral ulnar nerve lesion at the elbows.   Physician Disclaimer: I have personally reviewed and confirmed all HPI data with the patient.  [] : Post Surgical Visit: no [FreeTextEntry7] : b/l legs [FreeTextEntry8] : standing still  [de-identified] :  prolong  [de-identified] : currently [de-identified] : L MRI AT

## 2024-12-03 NOTE — PHYSICAL EXAM
[de-identified] : Constitutional:   - No acute distress   - Obese    Neurological:   - normal mood and affect   - alert and oriented x 3       Cardiovascular:   - grossly normal  Lumbar Spine Exam:  Inspection: erythema (-) ecchymosis (-) rashes (-) alignment: no scoliosis  Palpation: paraspinal tenderness:                    L (-) ; R (-) thoracic paraspinal tenderness:       L (-) ; R (-) sciatic nerve tenderness :                L (-) ; R (-) SI joint tenderness:                         L (-) ; R (-) GTB tenderness:                             L (-); R (-)  ROM: WNL with mild stiffness Pain with extremes of extension and flexion  Strength: Right/Left Hip Flexion: (5/5) (5/5) Quadriceps: (5/5) (5/5) Hamstrings: (5/5) (5/5) Ankle Dorsiflexion: (5/5) (5/5) EHL: (5/5) (5/5) Ankle Plantarflexion: (5/5) (5/5)  Special Tests: SLR:                 R (=) ; L (=) Facet loading:  R (+) ; L (+) WILLIAN test:     R (n/a) ; L (n/a) Tight Hamstrings R (+) ; L (+)  Neurologic: Light touch intact throughout LE  Reflexes normal and symmetric  Gait: antalgic gait, unsteady  ambulates with assistance of walker  ------------------------------------------------------------------------------ Cervical Spine Exam:   Inspection:   erythema (-)   ecchymosis (-)   rashes (-)    Palpation:                                                    Cervical paraspinal tenderness:         R (+); L(+)  Upper trapezius tenderness:              R (-); L (-)  Rhomboids tenderness:                      R (-); L (-)  Occipital Ridge:                                   R (+); L (+)  Supraspinatus tenderness:                 R (-); L (-)   ROM:   Reduced ROM all planes; crepitus throughout ROM testing pain throughout range of motion testing  Strength Testing:              Deltoid                           R (5/5); L (5/5)  Biceps:                          R (5/5); L (5/5)  Triceps:                         R (5/5); L (5/5)  Finger Abductors:         R (5/5); L (5/5)  Grasp:                           R (5/5); L (5/5)   Special Testing:  Spurling Test:                  R (-); L (-)  Facet load test:               R (+); L (+)   Neuro:  SILT throughout right upper extremity  SILT throughout left upper extremity   Reflexes:  Biceps   -           R (2+); L (2+)  Triceps  -           R (2+); L (2+)  Brachioradialis- R (2+); L (2+)     No ankle clonus

## 2024-12-03 NOTE — HISTORY OF PRESENT ILLNESS
[Lower back] : lower back [4] : 4 [2] : 2 [Dull/Aching] : dull/aching [Intermittent] : intermittent [Household chores] : household chores [Leisure] : leisure [Injection therapy] : injection therapy [Standing] : standing [Walking] : walking [Retired] : Work status: retired [FreeTextEntry1] : 12/3/2024 - Patient presents for FUV after a L5-S1 LESI on 11/15/2024. Patient reports 65% relief of symptoms and improvement in functionality.  She reports she has not required medication since the procedure and is pleased with relief in her low back pain. She has been receiving acupuncture as well and finds that has provided significant relief to her low back pain.    Today however, her most bothersome area of pain today is her cervical pain and headaches.  She is currently undergoing treatment for vitamin B12 and vitamin D deficiencies.  She is interested in scheduling a repeat DARRELL with the hope of decreasing her pain and increasing her functionality and quality of life.   10/25/2024 - Patient presents for a FUV after a bilateral occipital nerve block with U/S guidance on 2024. Patient reports mild improvement in her headaches at this time however, she has been addressing her cervical spine and finds that has been contributing to her relief.  She was seen by neurology who determined the HAs were generating from her neck and she now has been in physical therapy 3 times/week for the past 14 weeks and will be initiating acupuncture in the next few weeks as well. Of note, patient established care with a new cardiologist and is now on a new medication regimen and reports better controlled hypertension.  Today. her most bothersome symptom is her low back pain with radiation to bilateral lower extremities. She reports burning, numbness and tingling to bilateral feet. Presents today to discuss next steps in her treatment plan for her low back pain with the hope of decreasing her pain and increasing her functionality and quality of life.   2024 - Patient presents for FUV after a bilateral occipital nerve block on 2024. Patient reports a significant reduction in the frequency and intensity of her headaches for about 3-4 days s/p occipital block.  Unfortunately, she experienced a return of symptoms essentially to baseline.  She does continue to have headaches from which she thinks could possibly related to uncontrolled hypertension.  She has been seeing cardiology and will follow up with neurology.  2024 - Patient presents for FUV after a L5-S1 LESI on 2024. Patient reports moderate reduction of her lower back and right leg pain following the epidural.  Unfortunately, patient was seen in the ER 3 days post procedure for what she describes as severe hypertension and ongoing headache.  She was evaluated and discharged home.  Patient has been dealing with headaches for quite some time now without definitive source.  Has seen her longtime neurologist Dr. Kellerman and recently had an evaluation with Dr. Salmon ([facility]) who ordered repeat imaging of the C/T/L spine as well as brain.  Headache severity is greatest at the bilateral occiput with radiation to the top of her head.  2024 - Patient presents for FUV regarding their lower back pain.  She was last seen approximately 6 months ago.  Patient reports sustained relief following her previous lumbar epidural steroid injection which lasted until approximately 1 month ago.  Now has a return of lower back with radiation down the posterior right leg stopping at the posterior calf.  Additionally, she reports a flareup of her peripheral neuropathy as per her treating neurologist.  A course of intravenous corticosteroid was suggested and completed about 5 weeks ago.  Takes Cymbalta 60 mg and Tylenol daily with no benefit; she has discontinued Lyrica due to no perceived benefit.  Of note she is undergoing further neurological workup and will be undergoing a spinal tap this week.  2023 - Patient presents for FUV after a L5-S1 LESI on 2023. Patient reports a complete resolution of pain s/p injection, she is pleased with the results of the epidural; she continues to take 100 mg of Lyrica BID.   10/26/2023 - Patient presents for FUV regarding their lower back pain.   Patient reports radiation to the right lower extremity extending to the foot.  Currently has titrated to a dose of Lyrica 200 mg daily which has reduced some of the "neuropathy sensations". She also complains of additional multifocal joint pain; greatest of which is in her right knee.  She sees Dr. Nguyen for this.  2023- Patient presents for FUV regarding their lower back pain. Patient had started taking Tramadol and felt sick with nausea and sweating days after using, she has since discontinued use; she no longer gets IV steroid infusions as well.  Still with complaints in the low back with radiation to the right lower extremity extending to the ankle/foot.  Worst pain is concentrated in the right lateral aspect of the calf. Greatest degree of difficulty with ambulation.  2023 - Patient presents for FUV regarding their lower back pain.  Patient did not start the Gralise 2/ financial constraints.  She is following with a neurologist for IV steroid infusions for tx of peripheral neuropathy.  Patient reports she gets right sided arm numbness/tingling and right foot paranesthesia's.  She is becoming frustrated by her multitude of pain issues.  She is dealing with concurrent neck, low back, knee, and neurological issues.  2023 - Patient presents to review lumbar spine MRI from 2023. The majority of the patients pain is in the right foot, and occasionally the first digit on the left foot, there is no pain in the rest of the lower extremities, she has back pain but it is not as prominent as the right foot pain.  Has seen neurology in the past and obtained EMG.  Planning for follow-up in upcoming week.  2023 - Patient presents for FUV after a L5-S1 LESI on 2023.  Patient reports overall 50% relief sustained following the injection.  Patient reports her pain returned to baseline after about 2 weeks. She complains of primarily axial lower back pain specifically with prolonged standing.  2023 - Patient presents for FUV after a right PM C7-T1 DARRELL on 2023. Patient reports significant pain reduction following the injection (60% reduction in pain).  She is pleased with her response.  Patient's main complaint today is her axial lower back pain that has radiation to the medial right thigh, she had taken a trip to [place] recently for a week where she did extensive walking and exacerbated her back pain; she can endure 5-10 minutes of standing before it becomes unbearable.  Has had previous neurosurgical evaluations and surgery was recommended however she defers.  2023 - Patient presents for reevaluation.  She was last seen approximately 6 months ago.  Patient to the office regarding her neck pain. Patient was referred by Dr. Arevalo. Patient was originally seen for her back in September. Patient was placed on MDP for her right upper extremity radicular pain with good relief. Patient complains of right rotation stiffness in regards to her neck. Patient here for possible interventional management of cervical region ridiculous.  Taking dual action Advil once sometimes twice daily.  2022 - The patient presents for initial evaluation regarding their low back pain. Patient was referred by Dr. Nguyen. Patient sustained a slip and fall in 2021 and landed on her buttocks. Significant flare-up in her lower back since fall.  Experiences numbness in her toes and fingertips get tingling sensation when she closes her fists. Patient has gotten trigger point injections with no relief. Leg symptoms improve when seated. Diclofenac provides relief, previously tried Celebrex and Gabapentin - no relief.   Injections: 1) C7-T1 DARRELL (2023) 2) L5-S1 LESI (2023, 2023, 2024, 11/15/2024) 3) Bilateral occipital nerve block with U/S guidance - (2024, 2024)   Pertinent Surgical History: N/A   Imagin) MRI Lumbar Spine (2024) - [facility]  2) MRI Thoracic Spine (2024) - [facility]  3) MRI Cervical Spine (2024)- [facility]  Electrodiagnostic Testin) Lower Extremity NCV/EMG - (2022) - [facility]  1. 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 left peroneal motor and the right peroneal motor nerves showed reduced amplitude and decreased conduction velocity. The left tibial motor nerve showed decreased conduction velocity. The right tibial motor nerve showed reduced amplitude. The left sural sensory and right sural sensory nerves were unremarkable.  3) Upper Extremity NCV/EMG - (22) - [facility]  1. very mild bilateral median nerve lesions at the wrists 2. very mild bilateral ulnar nerve lesion at the elbows.   Physician Disclaimer: I have personally reviewed and confirmed all HPI data with the patient.  [] : Post Surgical Visit: no [FreeTextEntry7] : b/l legs [FreeTextEntry8] : standing still  [de-identified] :  prolong  [de-identified] : currently [de-identified] : L MRI AT

## 2024-12-03 NOTE — ASSESSMENT
[FreeTextEntry1] : A discussion regarding available pain management treatment options occurred with the patient. These included interventional, rehabilitative, pharmacological, and alternative modalities. We will proceed with the following:  Interventional treatment options: - None indicated present time - Once again patient was counseled as to limitations of corticosteroid administration overall - Can consider repeat L5-S1 LESI (80 mg Kenalog) with return of severe LE radicular pain - Previously discussed realistic expectations of interventional pain therapies in the setting of multilevel central canal stenosis - see additional instructions below  Rehabilitative options: - continue physical therapy for cervical spine - Failed physical therapy trial in the past regarding lumbar spine; she defers further - encouraged participation in active HEP as tolerated  Medication based treatment options: - continue Tylenol 500-1000 mg up to TID on PRN basis - Continue meloxicam 7.5-15 mg daily as needed; counseled patient to discuss consistent NSAID use with her cardiologist - Continue Cymbalta 90 mg daily, prescribed by neurology - Failed previous trials of Lyrica and gabapentin - see additional instructions below  Complementary treatment options: - Weight management and lifestyle modifications discussed - Continue acupuncture  Behavioral pain treatment options: - Advised referral to pain psychology; referral provided - Patient was counseled regarding alternative coping strategies to deal with her chronic multifocal pain  Additional treatment recommendations as follows: - Previously discussed orthopedic evaluation for lumbar decompression; she defers given caretaker responsibilities for her  - Patient was counseled as to red flag signs and when to seek urgent care. - Follow-up with neurology as directed - Follow up in 3 months  We have discussed the risks, benefits, and alternatives NSAID therapy including but not limited to the risk of bleeding, thrombosis, gastric mucosal irritation/ulceration, allergic reaction and kidney dysfunction; the patient verbalizes an understanding.  I, Kateryna Espinoza, acting as scribe, attest that this documentation has been prepared under the direction and in the presence of Provider Alverto Barney DO.  The documentation recorded by the scribe, in my presence, accurately reflects the service I personally performed, and the decisions made by me with my edits as appropriate.

## 2024-12-03 NOTE — PHYSICAL EXAM
[de-identified] : Constitutional:   - No acute distress   - Obese    Neurological:   - normal mood and affect   - alert and oriented x 3       Cardiovascular:   - grossly normal  Lumbar Spine Exam:  Inspection: erythema (-) ecchymosis (-) rashes (-) alignment: no scoliosis  Palpation: paraspinal tenderness:                    L (-) ; R (-) thoracic paraspinal tenderness:       L (-) ; R (-) sciatic nerve tenderness :                L (-) ; R (-) SI joint tenderness:                         L (-) ; R (-) GTB tenderness:                             L (-); R (-)  ROM: WNL with mild stiffness Pain with extremes of extension and flexion  Strength: Right/Left Hip Flexion: (5/5) (5/5) Quadriceps: (5/5) (5/5) Hamstrings: (5/5) (5/5) Ankle Dorsiflexion: (5/5) (5/5) EHL: (5/5) (5/5) Ankle Plantarflexion: (5/5) (5/5)  Special Tests: SLR:                 R (=) ; L (=) Facet loading:  R (+) ; L (+) WILLIAN test:     R (n/a) ; L (n/a) Tight Hamstrings R (+) ; L (+)  Neurologic: Light touch intact throughout LE  Reflexes normal and symmetric  Gait: antalgic gait, unsteady  ambulates with assistance of walker  ------------------------------------------------------------------------------ Cervical Spine Exam:   Inspection:   erythema (-)   ecchymosis (-)   rashes (-)    Palpation:                                                    Cervical paraspinal tenderness:         R (+); L(+)  Upper trapezius tenderness:              R (-); L (-)  Rhomboids tenderness:                      R (-); L (-)  Occipital Ridge:                                   R (+); L (+)  Supraspinatus tenderness:                 R (-); L (-)   ROM:   Reduced ROM all planes; crepitus throughout ROM testing pain throughout range of motion testing  Strength Testing:              Deltoid                           R (5/5); L (5/5)  Biceps:                          R (5/5); L (5/5)  Triceps:                         R (5/5); L (5/5)  Finger Abductors:         R (5/5); L (5/5)  Grasp:                           R (5/5); L (5/5)   Special Testing:  Spurling Test:                  R (-); L (-)  Facet load test:               R (+); L (+)   Neuro:  SILT throughout right upper extremity  SILT throughout left upper extremity   Reflexes:  Biceps   -           R (2+); L (2+)  Triceps  -           R (2+); L (2+)  Brachioradialis- R (2+); L (2+)     No ankle clonus

## 2024-12-05 NOTE — PROCEDURE
[de-identified] : Acupuncture treatment: Baldry technique with multiple needle placement to the lumbar paraspinal and gluteal musculature with UV lamp x 45 minutes Paralumbar chain (bladder meridian) with ES x 45 minutes K3-HT3 SI4-BL60 with ES x 45 minutes BL 10, SP 6, ST 36, LR 3, LI 4, BL 59 Patient tolerated procedure well

## 2024-12-09 NOTE — HISTORY OF PRESENT ILLNESS
[de-identified] : 12/2/24: Patient here to start Visco-3 series injection #1 left knee and right thumb. ([facility] program)   10/28/24: Pt here today for follow-up for bilateral knee pain and right hand pain. Pain and symptoms are better overall. Since last visit patient received euflexxa gel series, which she had some relief for 3 months. Today c/o pain in the back of the knee and difficulty w/ knee flexion.    8/12/24: Here for Euflexxa inj #3 B/L knees  8/5/24: Here for Euflexxa inj #2 B/L knees  7/29/24: Here for Euflexxa #1 bilateral knees  7/15/24: Here for follow up of B/L knees. Continues to have pain in knees. Currently using a walker for ambulation.

## 2024-12-09 NOTE — HISTORY OF PRESENT ILLNESS
[de-identified] : 12/2/24: Patient here to start Visco-3 series injection #1 left knee and right thumb. ([facility] program)   10/28/24: Pt here today for follow-up for bilateral knee pain and right hand pain. Pain and symptoms are better overall. Since last visit patient received euflexxa gel series, which she had some relief for 3 months. Today c/o pain in the back of the knee and difficulty w/ knee flexion.    8/12/24: Here for Euflexxa inj #3 B/L knees  8/5/24: Here for Euflexxa inj #2 B/L knees  7/29/24: Here for Euflexxa #1 bilateral knees  7/15/24: Here for follow up of B/L knees. Continues to have pain in knees. Currently using a walker for ambulation.

## 2024-12-09 NOTE — DISCUSSION/SUMMARY
[de-identified] : The natural progression of osteoarthritis was explained to the patient. Dx is OA in bilateral knees and right hand. We discussed the possible treatment options from conservative to operative.  These included NSAIDS, Glucosamine and Chondrotin sulfate, and Physical Therapy as well different types of injections.  We also discussed that at some point they may progress to needed a TKA, not needed at this time. Information and pamphlets were given.  1. Patient states she has pain in her CMC joint of RT Thumb, wants to do gel injections. Informed patient it's not guaranteed this treatment will work. Gel inj received, sarmad well. 2. Visco-3 received in left knee, sarmad well.  Follow up: 1 week for Visco-3 inj #2     Dx / Natural History: The patient was advised of the diagnosis.  The natural history of the pathology was explained in full to the patient in layman's terms.  Several different treatment options were discussed and explained in full to the patient including the risks and benefits of both surgical and non-surgical treatments.  All questions and concerns were answered.   Pain Guide Activities: The patient was advised to let pain guide the gradual advancement of activities.  Physical Therapy: The patient was provided with a prescription for Physical Therapy.  Icing: The patient was advised to apply ice (wrapped in a towel or protective covering) to the area daily (20 minutes at a time, 2-4X/day).

## 2024-12-09 NOTE — DISCUSSION/SUMMARY
[de-identified] : The natural progression of osteoarthritis was explained to the patient. Dx is OA in bilateral knees and right hand. We discussed the possible treatment options from conservative to operative.  These included NSAIDS, Glucosamine and Chondrotin sulfate, and Physical Therapy as well different types of injections.  We also discussed that at some point they may progress to needed a TKA, not needed at this time. Information and pamphlets were given.  1. Patient states she has pain in her CMC joint of RT Thumb, wants to do gel injections. Informed patient it's not guaranteed this treatment will work. Gel inj received, sarmad well. 2. Visco-3 received in left knee, sarmad well.  Follow up: 1 week for Visco-3 inj #2     Dx / Natural History: The patient was advised of the diagnosis.  The natural history of the pathology was explained in full to the patient in layman's terms.  Several different treatment options were discussed and explained in full to the patient including the risks and benefits of both surgical and non-surgical treatments.  All questions and concerns were answered.   Pain Guide Activities: The patient was advised to let pain guide the gradual advancement of activities.  Physical Therapy: The patient was provided with a prescription for Physical Therapy.  Icing: The patient was advised to apply ice (wrapped in a towel or protective covering) to the area daily (20 minutes at a time, 2-4X/day). none

## 2024-12-09 NOTE — PROCEDURE
[Medium Joint Injection] : medium joint injection [Left] : of the left [CMC Joint] : CMC joint [Pain] : pain [X-ray evidence of Osteoarthritis on this or prior visit] : x-ray evidence of Osteoarthritis on this or prior visit [Alcohol] : alcohol [Ethyl Chloride sprayed topically] : ethyl chloride sprayed topically [Sterile technique used] : sterile technique used [Visco-3 (25mg)] : 25mg of Visco-3 [#1] : series #1 [] : Patient tolerated procedure well [Call if redness, pain or fever occur] : call if redness, pain or fever occur [Apply ice for 15min out of every hour for the next 12-24 hours as tolerated] : apply ice for 15 minutes out of every hour for the next 12-24 hours as tolerated [Risks, benefits, alternatives discussed / Verbal consent obtained] : the risks benefits, and alternatives have been discussed, and verbal consent was obtained [Visualization of the needle and placement of injection was performed without complication] : visualization of the needle and placement of injection was performed without complication [Right] : of the right [FreeTextEntry3] : The risks, benefits, and alternatives to viscosupplementation injection were reviewed with the patient. Risks outlined include but are not limited to infection, sepsis, bleeding, scarring, temporary increase in pain, syncopal episode, failure to resolve symptoms, symptoms recurrence, allergic reaction, flare reaction, pseudoseptic reaction.  Patient understood the risks and asked to proceed with this treatment course.   Large joint injection was performed of LEFT knee. The indication for this procedure was pain, inflammation and x-ray evidence of Osteoarthritis on this or prior visit. The site was prepped with alcohol, betadine, ethyl chloride sprayed topically and sterile technique used. An injection of Visco-3, series # 1 was used.   Patient tolerated procedure well. Patient was advised to call if redness, pain or fever occur and apply ice for 15 minutes out of every hour for the next 12-24 hours as tolerated.   Patient has tried OTC's including aspirin, Ibuprofen, Aleve, etc or prescription NSAIDS, and/or exercises at home and/or physical therapy without satisfactory response, patient had decreased mobility in the joint and the risks benefits, and alternatives have been discussed, and verbal consent was obtained.   Ultrasound guidance was indicated for this patient due to precise injection in area of tear. All ultrasound images have been permanently captured and stored accordingly in our picture archiving and communication system. Visualization of the needle and placement of injection was performed without complication.

## 2024-12-12 NOTE — PROCEDURE
[de-identified] : Acupuncture treatment: Baldry technique with multiple needle placement to the lumbar paraspinal and gluteal musculature with UV lamp x 45 minutes Paralumbar chain (bladder meridian) with ES x 45 minutes K3-HT3 SI4-BL60 with ES x 45 minutes BL 10, SP 6, ST 36, LR 3, LI 4, BL 59 Patient tolerated procedure well

## 2024-12-16 NOTE — PROCEDURE
[Medium Joint Injection] : medium joint injection [CMC Joint] : CMC joint [Pain] : pain [X-ray evidence of Osteoarthritis on this or prior visit] : x-ray evidence of Osteoarthritis on this or prior visit [Alcohol] : alcohol [Ethyl Chloride sprayed topically] : ethyl chloride sprayed topically [Sterile technique used] : sterile technique used [Visco-3 (25mg)] : 25mg of Visco-3 [] : Patient tolerated procedure well [Call if redness, pain or fever occur] : call if redness, pain or fever occur [Apply ice for 15min out of every hour for the next 12-24 hours as tolerated] : apply ice for 15 minutes out of every hour for the next 12-24 hours as tolerated [Risks, benefits, alternatives discussed / Verbal consent obtained] : the risks benefits, and alternatives have been discussed, and verbal consent was obtained [Visualization of the needle and placement of injection was performed without complication] : visualization of the needle and placement of injection was performed without complication [Right] : of the right [#2] : series #2 [FreeTextEntry3] : The risks, benefits, and alternatives to viscosupplementation injection were reviewed with the patient. Risks outlined include but are not limited to infection, sepsis, bleeding, scarring, temporary increase in pain, syncopal episode, failure to resolve symptoms, symptoms recurrence, allergic reaction, flare reaction, pseudoseptic reaction.  Patient understood the risks and asked to proceed with this treatment course.   Large joint injection was performed of LEFT knee. The indication for this procedure was pain, inflammation and x-ray evidence of Osteoarthritis on this or prior visit. The site was prepped with alcohol, betadine, ethyl chloride sprayed topically and sterile technique used. An injection of Visco-3, series # 2 was used.   Patient tolerated procedure well. Patient was advised to call if redness, pain or fever occur and apply ice for 15 minutes out of every hour for the next 12-24 hours as tolerated.   Patient has tried OTC's including aspirin, Ibuprofen, Aleve, etc or prescription NSAIDS, and/or exercises at home and/or physical therapy without satisfactory response, patient had decreased mobility in the joint and the risks benefits, and alternatives have been discussed, and verbal consent was obtained.   Visualization of the needle and placement of injection was performed without complication.

## 2024-12-16 NOTE — IMAGING
[de-identified] : Constitutional: The patient appears well developed, well nourished. Skin: No impressive skin lesions present, except as noted in detailed exam. Lymphatic: No palpable lymphadenopathy in examined body areas. Neurologic: Alert and oriented to time, place and person.   LEFT KNEE:  Inspection: mild effusion Palpation: medial joint line tenderness, anterior tenderness Knee Range of Motion: 3-125 Strength: 5/5 Quadriceps strength, 5/5 Hamstring strength Neurological: light touch is intact throughout Ligament Stability and Special Tests: McMurrays: neg Lachman: neg Pivot Shift: neg Posterior Drawer: neg Valgus: neg Varus: neg Patella Apprehension: neg Patella Maltracking: neg   RIGHT WRIST and HAND Inspection: No erythema, No swelling  Palpation: TTP over CMC Range of Motion: Full range of motion. Strength: normal Neurological testing: motor exam 5/5 and light touch intact.  Special Testing:

## 2024-12-16 NOTE — HISTORY OF PRESENT ILLNESS
[de-identified] : 12/09/2024: Patient here to start Visco-3 series injection #2 left knee and right thumb  12/2/24: Patient here to start Visco-3 series injection #1 left knee and right thumb. ([facility] program)   10/28/24: Pt here today for follow-up for bilateral knee pain and right hand pain. Pain and symptoms are better overall. Since last visit patient received euflexxa gel series, which she had some relief for 3 months. Today c/o pain in the back of the knee and difficulty w/ knee flexion.    8/12/24: Here for Euflexxa inj #3 B/L knees  8/5/24: Here for Euflexxa inj #2 B/L knees  7/29/24: Here for Euflexxa #1 bilateral knees  7/15/24: Here for follow up of B/L knees. Continues to have pain in knees. Currently using a walker for ambulation.

## 2024-12-16 NOTE — IMAGING
[de-identified] : Constitutional: The patient appears well developed, well nourished. Skin: No impressive skin lesions present, except as noted in detailed exam. Lymphatic: No palpable lymphadenopathy in examined body areas. Neurologic: Alert and oriented to time, place and person.   LEFT KNEE:  Inspection: mild effusion Palpation: medial joint line tenderness, anterior tenderness Knee Range of Motion: 3-125 Strength: 5/5 Quadriceps strength, 5/5 Hamstring strength Neurological: light touch is intact throughout Ligament Stability and Special Tests: McMurrays: neg Lachman: neg Pivot Shift: neg Posterior Drawer: neg Valgus: neg Varus: neg Patella Apprehension: neg Patella Maltracking: neg   RIGHT WRIST and HAND Inspection: No erythema, No swelling  Palpation: TTP over CMC Range of Motion: Full range of motion. Strength: normal Neurological testing: motor exam 5/5 and light touch intact.  Special Testing:

## 2024-12-16 NOTE — DISCUSSION/SUMMARY
[de-identified] : The natural progression of osteoarthritis was explained to the patient. Dx is OA in bilateral knees and right hand. We discussed the possibility of treatment options from conservative to operative.  These included NSAIDS, Glucosamine and Chondrotin sulfate, and Physical Therapy as well different types of injections.  We also discussed that at some point they may progress to needed a TKA, not needed at this time. Information and pamphlets were given.  1. Patient expressed worsened pain in knee and hand after injection. Right hand is swollen. Discussed knee pain is normal. Discussed discontinuing thumb injections and patient decided to continue. 1. Patient wants to continue with gel injections. Informed patient it's not guaranteed this treatment will work. Visco-3 #2 inj received, sarmad well. 2. Visco-3 #2 received in left knee, sarmad well.  Follow up: 1 week for Visco-3 inj #3     Dx / Natural History: The patient was advised of the diagnosis.  The natural history of the pathology was explained in full to the patient in layman's terms.  Several different treatment options were discussed and explained in full to the patient including the risks and benefits of both surgical and non-surgical treatments.  All questions and concerns were answered.   Pain Guide Activities: The patient was advised to let pain guide the gradual advancement of activities.  Physical Therapy: The patient was provided with a prescription for Physical Therapy.  Icing: The patient was advised to apply ice (wrapped in a towel or protective covering) to the area daily (20 minutes at a time, 2-4X/day).

## 2024-12-16 NOTE — END OF VISIT
[Time Spent: ___ minutes] : I have spent [unfilled] minutes of time on the encounter which excludes teaching and separately reported services. Detail Level: Generalized

## 2024-12-16 NOTE — DISCUSSION/SUMMARY
[de-identified] : The natural progression of osteoarthritis was explained to the patient. Dx is OA in bilateral knees and right hand. We discussed the possibility of treatment options from conservative to operative.  These included NSAIDS, Glucosamine and Chondrotin sulfate, and Physical Therapy as well different types of injections.  We also discussed that at some point they may progress to needed a TKA, not needed at this time. Information and pamphlets were given.  1. Patient expressed worsened pain in knee and hand after injection. Right hand is swollen. Discussed knee pain is normal. Discussed discontinuing thumb injections and patient decided to continue. 1. Patient wants to continue with gel injections. Informed patient it's not guaranteed this treatment will work. Visco-3 #2 inj received, sarmad well. 2. Visco-3 #2 received in left knee, sarmad well.  Follow up: 1 week for Visco-3 inj #3     Dx / Natural History: The patient was advised of the diagnosis.  The natural history of the pathology was explained in full to the patient in layman's terms.  Several different treatment options were discussed and explained in full to the patient including the risks and benefits of both surgical and non-surgical treatments.  All questions and concerns were answered.   Pain Guide Activities: The patient was advised to let pain guide the gradual advancement of activities.  Physical Therapy: The patient was provided with a prescription for Physical Therapy.  Icing: The patient was advised to apply ice (wrapped in a towel or protective covering) to the area daily (20 minutes at a time, 2-4X/day).

## 2024-12-16 NOTE — HISTORY OF PRESENT ILLNESS
[de-identified] : 12/09/2024: Patient here to start Visco-3 series injection #2 left knee and right thumb  12/2/24: Patient here to start Visco-3 series injection #1 left knee and right thumb. ([facility] program)   10/28/24: Pt here today for follow-up for bilateral knee pain and right hand pain. Pain and symptoms are better overall. Since last visit patient received euflexxa gel series, which she had some relief for 3 months. Today c/o pain in the back of the knee and difficulty w/ knee flexion.    8/12/24: Here for Euflexxa inj #3 B/L knees  8/5/24: Here for Euflexxa inj #2 B/L knees  7/29/24: Here for Euflexxa #1 bilateral knees  7/15/24: Here for follow up of B/L knees. Continues to have pain in knees. Currently using a walker for ambulation.

## 2024-12-19 NOTE — PROCEDURE
[de-identified] : Acupuncture treatment: Baldry technique with multiple needle placement to the lumbar paraspinal and gluteal musculature with UV lamp x 45 minutes Paralumbar chain (bladder meridian) with ES x 45 minutes K3-HT3 SI4-BL60 with ES x 45 minutes BL 10, SP 6, ST 36, LR 3, LI 4, BL 59 Patient tolerated procedure well

## 2024-12-26 NOTE — IMAGING
[de-identified] : Constitutional: The patient appears well developed, well nourished. Skin: No impressive skin lesions present, except as noted in detailed exam. Lymphatic: No palpable lymphadenopathy in examined body areas. Neurologic: Alert and oriented to time, place and person.   LEFT KNEE:  Inspection: mild effusion Palpation: medial joint line tenderness, anterior tenderness Knee Range of Motion: 3-125 Strength: 5/5 Quadriceps strength, 5/5 Hamstring strength Neurological: light touch is intact throughout Ligament Stability and Special Tests: McMurrays: neg Lachman: neg Pivot Shift: neg Posterior Drawer: neg Valgus: neg Varus: neg Patella Apprehension: neg Patella Maltracking: neg   RIGHT WRIST and HAND Inspection: No erythema, No swelling  Palpation: TTP over CMC Range of Motion: Full range of motion. Strength: normal Neurological testing: motor exam 5/5 and light touch intact.  Special Testing:

## 2024-12-26 NOTE — PROCEDURE
[de-identified] : Acupuncture treatment: Baldry technique with multiple needle placement to the lumbar paraspinal and gluteal musculature with UV lamp x 45 minutes Paralumbar chain (bladder meridian) with ES x 45 minutes K3-HT3 SI4-BL60 with ES x 45 minutes BL 10, SP 6, ST 36, LR 3, LI 4, BL 59 Patient tolerated procedure well

## 2024-12-26 NOTE — DISCUSSION/SUMMARY
[de-identified] : The natural progression of osteoarthritis was explained to the patient. Dx is OA in bilateral knees and right hand. We discussed the possibility of treatment options from conservative to operative.  These included NSAIDS, Glucosamine and Chondrotin sulfate, and Physical Therapy as well different types of injections.  We also discussed that at some point they may progress to needed a TKA, not needed at this time. Information and pamphlets were given.  1. Patient wants to continue with gel injections in right thumb. Informed patient it's not guaranteed this treatment will work. Visco-3 #3 inj received, sarmad well. 2. Visco-3 #3 received in left knee, sarmad well. 3. Advised patient to take extra-strength Tylenol, 2 pills 2x/day with Meloxicam to help with knee pain.    Follow up: February to initiate b/l knee euflexxa inj    Dx / Natural History: The patient was advised of the diagnosis.  The natural history of the pathology was explained in full to the patient in layman's terms.  Several different treatment options were discussed and explained in full to the patient including the risks and benefits of both surgical and non-surgical treatments.  All questions and concerns were answered.   Pain Guide Activities: The patient was advised to let pain guide the gradual advancement of activities.  Physical Therapy: The patient was provided with a prescription for Physical Therapy.  Icing: The patient was advised to apply ice (wrapped in a towel or protective covering) to the area daily (20 minutes at a time, 2-4X/day).

## 2024-12-26 NOTE — IMAGING
[de-identified] : Constitutional: The patient appears well developed, well nourished. Skin: No impressive skin lesions present, except as noted in detailed exam. Lymphatic: No palpable lymphadenopathy in examined body areas. Neurologic: Alert and oriented to time, place and person.   LEFT KNEE:  Inspection: mild effusion Palpation: medial joint line tenderness, anterior tenderness Knee Range of Motion: 3-125 Strength: 5/5 Quadriceps strength, 5/5 Hamstring strength Neurological: light touch is intact throughout Ligament Stability and Special Tests: McMurrays: neg Lachman: neg Pivot Shift: neg Posterior Drawer: neg Valgus: neg Varus: neg Patella Apprehension: neg Patella Maltracking: neg   RIGHT WRIST and HAND Inspection: No erythema, No swelling  Palpation: TTP over CMC Range of Motion: Full range of motion. Strength: normal Neurological testing: motor exam 5/5 and light touch intact.  Special Testing:

## 2024-12-26 NOTE — HISTORY OF PRESENT ILLNESS
[de-identified] : 12/16/24: Patient here for Visco-3 series injection #3 left knee and right thumb.  12/09/2024: Patient here to start Visco-3 series injection #2 left knee and right thumb.  12/2/24: Patient here to start Visco-3 series injection #1 left knee and right thumb. ([facility] program)   10/28/24: Pt here today for follow-up for bilateral knee pain and right hand pain. Pain and symptoms are better overall. Since last visit patient received euflexxa gel series, which she had some relief for 3 months. Today c/o pain in the back of the knee and difficulty w/ knee flexion.    8/12/24: Here for Euflexxa inj #3 B/L knees  8/5/24: Here for Euflexxa inj #2 B/L knees  7/29/24: Here for Euflexxa #1 bilateral knees  7/15/24: Here for follow up of B/L knees. Continues to have pain in knees. Currently using a walker for ambulation.

## 2024-12-26 NOTE — IMAGING
[de-identified] : Constitutional: The patient appears well developed, well nourished. Skin: No impressive skin lesions present, except as noted in detailed exam. Lymphatic: No palpable lymphadenopathy in examined body areas. Neurologic: Alert and oriented to time, place and person.   LEFT KNEE:  Inspection: mild effusion Palpation: medial joint line tenderness, anterior tenderness Knee Range of Motion: 3-125 Strength: 5/5 Quadriceps strength, 5/5 Hamstring strength Neurological: light touch is intact throughout Ligament Stability and Special Tests: McMurrays: neg Lachman: neg Pivot Shift: neg Posterior Drawer: neg Valgus: neg Varus: neg Patella Apprehension: neg Patella Maltracking: neg   RIGHT WRIST and HAND Inspection: No erythema, No swelling  Palpation: TTP over CMC Range of Motion: Full range of motion. Strength: normal Neurological testing: motor exam 5/5 and light touch intact.  Special Testing:

## 2024-12-26 NOTE — HISTORY OF PRESENT ILLNESS
[de-identified] : 12/16/24: Patient here for Visco-3 series injection #3 left knee and right thumb.  12/09/2024: Patient here to start Visco-3 series injection #2 left knee and right thumb.  12/2/24: Patient here to start Visco-3 series injection #1 left knee and right thumb. ([facility] program)   10/28/24: Pt here today for follow-up for bilateral knee pain and right hand pain. Pain and symptoms are better overall. Since last visit patient received euflexxa gel series, which she had some relief for 3 months. Today c/o pain in the back of the knee and difficulty w/ knee flexion.    8/12/24: Here for Euflexxa inj #3 B/L knees  8/5/24: Here for Euflexxa inj #2 B/L knees  7/29/24: Here for Euflexxa #1 bilateral knees  7/15/24: Here for follow up of B/L knees. Continues to have pain in knees. Currently using a walker for ambulation.

## 2024-12-26 NOTE — HISTORY OF PRESENT ILLNESS
[de-identified] : 12/16/24: Patient here for Visco-3 series injection #3 left knee and right thumb.  12/09/2024: Patient here to start Visco-3 series injection #2 left knee and right thumb.  12/2/24: Patient here to start Visco-3 series injection #1 left knee and right thumb. ([facility] program)   10/28/24: Pt here today for follow-up for bilateral knee pain and right hand pain. Pain and symptoms are better overall. Since last visit patient received euflexxa gel series, which she had some relief for 3 months. Today c/o pain in the back of the knee and difficulty w/ knee flexion.    8/12/24: Here for Euflexxa inj #3 B/L knees  8/5/24: Here for Euflexxa inj #2 B/L knees  7/29/24: Here for Euflexxa #1 bilateral knees  7/15/24: Here for follow up of B/L knees. Continues to have pain in knees. Currently using a walker for ambulation.

## 2024-12-26 NOTE — DISCUSSION/SUMMARY
[de-identified] : The natural progression of osteoarthritis was explained to the patient. Dx is OA in bilateral knees and right hand. We discussed the possibility of treatment options from conservative to operative.  These included NSAIDS, Glucosamine and Chondrotin sulfate, and Physical Therapy as well different types of injections.  We also discussed that at some point they may progress to needed a TKA, not needed at this time. Information and pamphlets were given.  1. Patient wants to continue with gel injections in right thumb. Informed patient it's not guaranteed this treatment will work. Visco-3 #3 inj received, sarmad well. 2. Visco-3 #3 received in left knee, sarmad well. 3. Advised patient to take extra-strength Tylenol, 2 pills 2x/day with Meloxicam to help with knee pain.    Follow up: February to initiate b/l knee euflexxa inj    Dx / Natural History: The patient was advised of the diagnosis.  The natural history of the pathology was explained in full to the patient in layman's terms.  Several different treatment options were discussed and explained in full to the patient including the risks and benefits of both surgical and non-surgical treatments.  All questions and concerns were answered.   Pain Guide Activities: The patient was advised to let pain guide the gradual advancement of activities.  Physical Therapy: The patient was provided with a prescription for Physical Therapy.  Icing: The patient was advised to apply ice (wrapped in a towel or protective covering) to the area daily (20 minutes at a time, 2-4X/day).

## 2024-12-26 NOTE — DISCUSSION/SUMMARY
[de-identified] : The natural progression of osteoarthritis was explained to the patient. Dx is OA in bilateral knees and right hand. We discussed the possibility of treatment options from conservative to operative.  These included NSAIDS, Glucosamine and Chondrotin sulfate, and Physical Therapy as well different types of injections.  We also discussed that at some point they may progress to needed a TKA, not needed at this time. Information and pamphlets were given.  1. Patient wants to continue with gel injections in right thumb. Informed patient it's not guaranteed this treatment will work. Visco-3 #3 inj received, sarmad well. 2. Visco-3 #3 received in left knee, sarmad well. 3. Advised patient to take extra-strength Tylenol, 2 pills 2x/day with Meloxicam to help with knee pain.    Follow up: February to initiate b/l knee euflexxa inj    Dx / Natural History: The patient was advised of the diagnosis.  The natural history of the pathology was explained in full to the patient in layman's terms.  Several different treatment options were discussed and explained in full to the patient including the risks and benefits of both surgical and non-surgical treatments.  All questions and concerns were answered.   Pain Guide Activities: The patient was advised to let pain guide the gradual advancement of activities.  Physical Therapy: The patient was provided with a prescription for Physical Therapy.  Icing: The patient was advised to apply ice (wrapped in a towel or protective covering) to the area daily (20 minutes at a time, 2-4X/day).

## 2024-12-26 NOTE — PROCEDURE
[Medium Joint Injection] : medium joint injection [Right] : of the right [CMC Joint] : CMC joint [Pain] : pain [X-ray evidence of Osteoarthritis on this or prior visit] : x-ray evidence of Osteoarthritis on this or prior visit [Alcohol] : alcohol [Ethyl Chloride sprayed topically] : ethyl chloride sprayed topically [Sterile technique used] : sterile technique used [Visco-3 (25mg)] : 25mg of Visco-3 [] : Patient tolerated procedure well [Call if redness, pain or fever occur] : call if redness, pain or fever occur [Apply ice for 15min out of every hour for the next 12-24 hours as tolerated] : apply ice for 15 minutes out of every hour for the next 12-24 hours as tolerated [Risks, benefits, alternatives discussed / Verbal consent obtained] : the risks benefits, and alternatives have been discussed, and verbal consent was obtained [Visualization of the needle and placement of injection was performed without complication] : visualization of the needle and placement of injection was performed without complication [#3] : series #3 [FreeTextEntry3] : The risks, benefits, and alternatives to viscosupplementation injection were reviewed with the patient. Risks outlined include but are not limited to infection, sepsis, bleeding, scarring, temporary increase in pain, syncopal episode, failure to resolve symptoms, symptoms recurrence, allergic reaction, flare reaction, pseudoseptic reaction.  Patient understood the risks and asked to proceed with this treatment course.   Large joint injection was performed of LEFT knee. The indication for this procedure was pain, inflammation and x-ray evidence of Osteoarthritis on this or prior visit. The site was prepped with alcohol, betadine, ethyl chloride sprayed topically and sterile technique used. An injection of Visco-3, series # 3 was used.   Patient tolerated procedure well. Patient was advised to call if redness, pain or fever occur and apply ice for 15 minutes out of every hour for the next 12-24 hours as tolerated.   Patient has tried OTC's including aspirin, Ibuprofen, Aleve, etc or prescription NSAIDS, and/or exercises at home and/or physical therapy without satisfactory response, patient had decreased mobility in the joint and the risks benefits, and alternatives have been discussed, and verbal consent was obtained.   Visualization of the needle and placement of injection was performed without complication.

## 2025-01-02 NOTE — PROCEDURE
[de-identified] : Acupuncture treatment: Baldry technique with multiple needle placement to the lumbar paraspinal and gluteal musculature with UV lamp x 45 minutes Paralumbar chain (bladder meridian) with ES x 45 minutes K3-HT3 SI4-BL60 with ES x 45 minutes BL 10, SP 6, ST 36, LR 3, LI 4, BL 59 Patient tolerated procedure well

## 2025-01-09 NOTE — PROCEDURE
[de-identified] : Acupuncture treatment: Baldry technique with multiple needle placement to the lumbar paraspinal and gluteal musculature with UV lamp x 45 minutes Paralumbar chain (bladder meridian) with ES x 45 minutes K3-HT3 SI4-BL60 with ES x 45 minutes BL 10, SP 6, ST 36, LR 3, LI 4, BL 59 Patient tolerated procedure well

## 2025-01-16 NOTE — PROCEDURE
[de-identified] : Acupuncture treatment: Baldry technique with multiple needle placement to the lumbar paraspinal and gluteal musculature with UV lamp x 45 minutes Paralumbar chain (bladder meridian) with ES x 45 minutes K3-HT3 SI4-BL60 with ES x 45 minutes BL 10, SP 6, ST 36, LR 3, LI 4, BL 59 Patient tolerated procedure well

## 2025-01-23 NOTE — PROCEDURE
[de-identified] : Acupuncture treatment: Baldry technique with multiple needle placement to the lumbar paraspinal and gluteal musculature with UV lamp x 45 minutes Paralumbar chain (bladder meridian) with ES x 45 minutes K3-HT3 SI4-BL60 with ES x 45 minutes BL 10, SP 6, ST 36, LR 3, LI 4, BL 59 Patient tolerated procedure well

## 2025-01-27 NOTE — IMAGING
[de-identified] : Constitutional: The patient appears well developed, well nourished. Skin: No impressive skin lesions present, except as noted in detailed exam. Lymphatic: No palpable lymphadenopathy in examined body areas. Neurologic: Alert and oriented to time, place and person.   RIGHT KNEE:  Inspection: mild effusion Palpation: medial joint line tenderness, anterior tenderness Knee Range of Motion: 3-125 Strength: 5/5 Quadriceps strength, 5/5 Hamstring strength Neurological: light touch is intact throughout Ligament Stability and Special Tests: McMurrays: neg Lachman: neg Pivot Shift: neg Posterior Drawer: neg Valgus: neg Varus: neg Patella Apprehension: neg Patella Maltracking: neg   RIGHT WRIST and HAND Inspection: No erythema, No swelling Palpation: TTP over CMC Range of Motion: Full range of motion. Strength: normal Neurological testing: motor exam 5/5 and light touch intact. Special Testing:     [Right] : right knee [There are no fractures, subluxations or dislocations. No significant abnormalities are seen] : There are no fractures, subluxations or dislocations. No significant abnormalities are seen [advanced tricompartmental OA with medial compartment narrowing and varus alignment] : advanced tricompartmental OA with medial compartment narrowing and varus alignment

## 2025-01-27 NOTE — IMAGING
[de-identified] : Constitutional: The patient appears well developed, well nourished. Skin: No impressive skin lesions present, except as noted in detailed exam. Lymphatic: No palpable lymphadenopathy in examined body areas. Neurologic: Alert and oriented to time, place and person.   RIGHT KNEE:  Inspection: mild effusion Palpation: medial joint line tenderness, anterior tenderness Knee Range of Motion: 3-125 Strength: 5/5 Quadriceps strength, 5/5 Hamstring strength Neurological: light touch is intact throughout Ligament Stability and Special Tests: McMurrays: neg Lachman: neg Pivot Shift: neg Posterior Drawer: neg Valgus: neg Varus: neg Patella Apprehension: neg Patella Maltracking: neg   RIGHT WRIST and HAND Inspection: No erythema, No swelling Palpation: TTP over CMC Range of Motion: Full range of motion. Strength: normal Neurological testing: motor exam 5/5 and light touch intact. Special Testing:     [Right] : right knee [There are no fractures, subluxations or dislocations. No significant abnormalities are seen] : There are no fractures, subluxations or dislocations. No significant abnormalities are seen [advanced tricompartmental OA with medial compartment narrowing and varus alignment] : advanced tricompartmental OA with medial compartment narrowing and varus alignment

## 2025-01-27 NOTE — HISTORY OF PRESENT ILLNESS
[de-identified] : 01/22/2025: Pt here today for follow-up right knee. Pain and symptoms are worse. Since last visit, pt said she had her right knee buckle today while doing laundry and has had swelling since. Today she is here to discuss whether she can get a CSI or a knee brace to help her pain.  12/09/2024: Patient here to start Visco-3 series injection #2 left knee and right thumb  12/2/24: Patient here to start Visco-3 series injection #1 left knee and right thumb. ([facility] program)   10/28/24: Pt here today for follow-up for bilateral knee pain and right hand pain. Pain and symptoms are better overall. Since last visit patient received euflexxa gel series, which she had some relief for 3 months. Today c/o pain in the back of the knee and difficulty w/ knee flexion.    8/12/24: Here for Euflexxa inj #3 B/L knees  8/5/24: Here for Euflexxa inj #2 B/L knees  7/29/24: Here for Euflexxa #1 bilateral knees  7/15/24: Here for follow up of B/L knees. Continues to have pain in knees. Currently using a walker for ambulation.

## 2025-01-27 NOTE — HISTORY OF PRESENT ILLNESS
[FreeTextEntry1] : Patient reports that she does not some improvement with acupuncture treatment as relates to her CS and LS complaints.  She reports decreased low back pain with diminished muscle spasm involving her low back with acupuncture treatments.  She continues to complain of low back pain which is aggravated with prolonged sitting standing and/or ambulation.  She continues to complain of headaches for which she is following up with her neurologist.  Presents today for reevaluation of low back pain.  Patient reports that she would like to continue with acupuncture treatments as she continues to find them beneficial in terms of decreasing pain and improving her level of function.  Allows her to continue to have independence with ADL activities.

## 2025-01-27 NOTE — DISCUSSION/SUMMARY
[de-identified] : The natural progression of osteoarthritis was explained to the patient. Dx is OA in bilateral knees and right hand. We discussed the possibility of treatment options from conservative to operative.  These included NSAIDS, Glucosamine and Chondrotin sulfate, and Physical Therapy as well different types of injections.  We also discussed that at some point they may progress to needed a TKA, not needed at this time. Information and pamphlets were given.  RIGHT KNEE: 1. Patient states she had a buckling event with right knee.  2. The risks, benefits, and alternatives to corticosteroid injections were reviewed with the patient. Risks outlined include but are not limited to infection, sepsis, bleeding, scarring, skin discoloration, temporary increase in pain, syncopal episode, failure to resolve symptoms, symptoms recurrence, allergic reaction, flare reaction, and elevation of blood sugar in diabetics. Patient understood the risks and asked to proceed with this treatment course. Tolerated well.    RIGHT THUMB: 1. Discussed splint    Follow up: for gel injections    Dx / Natural History: The patient was advised of the diagnosis.  The natural history of the pathology was explained in full to the patient in layman's terms.  Several different treatment options were discussed and explained in full to the patient including the risks and benefits of both surgical and non-surgical treatments.  All questions and concerns were answered.   Pain Guide Activities: The patient was advised to let pain guide the gradual advancement of activities.  Physical Therapy: The patient was provided with a prescription for Physical Therapy.  Icing: The patient was advised to apply ice (wrapped in a towel or protective covering) to the area daily (20 minutes at a time, 2-4X/day).

## 2025-01-27 NOTE — ASSESSMENT
[FreeTextEntry1] : Continue with Acupuncture 10 Sessions to L/S 1xweekly  Goals of which are to decrease pain, dissipate muscle spasm, increase ROM and improve level of function. Home exercise plan reviewed with patient. Stressed the importance for the need for frequent change in position from sit to stand and stand to sit, as well as use of weight shifting techniques to alleviate low back discomfort. Instruction in proper posturing, body mechanics and lifting techniques. Follow up with Neurologist for peripheral neuropathy and headaches Follow up with PALICE Bryant in 4 to 6 weeks   At least 20 minutes was spent with patient face to face examining patient, reviewing findings/results, counseling patient and coordinating treatment program. Ample time was provided to answer any questions or address concerns to the patient's satisfaction.

## 2025-01-27 NOTE — DISCUSSION/SUMMARY
[de-identified] : The natural progression of osteoarthritis was explained to the patient. Dx is OA in bilateral knees and right hand. We discussed the possibility of treatment options from conservative to operative.  These included NSAIDS, Glucosamine and Chondrotin sulfate, and Physical Therapy as well different types of injections.  We also discussed that at some point they may progress to needed a TKA, not needed at this time. Information and pamphlets were given.  RIGHT KNEE: 1. Patient states she had a buckling event with right knee.  2. The risks, benefits, and alternatives to corticosteroid injections were reviewed with the patient. Risks outlined include but are not limited to infection, sepsis, bleeding, scarring, skin discoloration, temporary increase in pain, syncopal episode, failure to resolve symptoms, symptoms recurrence, allergic reaction, flare reaction, and elevation of blood sugar in diabetics. Patient understood the risks and asked to proceed with this treatment course. Tolerated well.    RIGHT THUMB: 1. Discussed splint    Follow up: for gel injections    Dx / Natural History: The patient was advised of the diagnosis.  The natural history of the pathology was explained in full to the patient in layman's terms.  Several different treatment options were discussed and explained in full to the patient including the risks and benefits of both surgical and non-surgical treatments.  All questions and concerns were answered.   Pain Guide Activities: The patient was advised to let pain guide the gradual advancement of activities.  Physical Therapy: The patient was provided with a prescription for Physical Therapy.  Icing: The patient was advised to apply ice (wrapped in a towel or protective covering) to the area daily (20 minutes at a time, 2-4X/day).

## 2025-01-27 NOTE — HISTORY OF PRESENT ILLNESS
[de-identified] : 01/22/2025: Pt here today for follow-up right knee. Pain and symptoms are worse. Since last visit, pt said she had her right knee buckle today while doing laundry and has had swelling since. Today she is here to discuss whether she can get a CSI or a knee brace to help her pain.  12/09/2024: Patient here to start Visco-3 series injection #2 left knee and right thumb  12/2/24: Patient here to start Visco-3 series injection #1 left knee and right thumb. ([facility] program)   10/28/24: Pt here today for follow-up for bilateral knee pain and right hand pain. Pain and symptoms are better overall. Since last visit patient received euflexxa gel series, which she had some relief for 3 months. Today c/o pain in the back of the knee and difficulty w/ knee flexion.    8/12/24: Here for Euflexxa inj #3 B/L knees  8/5/24: Here for Euflexxa inj #2 B/L knees  7/29/24: Here for Euflexxa #1 bilateral knees  7/15/24: Here for follow up of B/L knees. Continues to have pain in knees. Currently using a walker for ambulation.

## 2025-01-28 NOTE — PROCEDURE
[de-identified] : Acupuncture treatment: Baldry technique with multiple needle placement to the lumbar paraspinal and gluteal musculature with UV lamp x 45 minutes Paralumbar chain (bladder meridian) with ES x 45 minutes K3-HT3 SI4-BL60 with ES x 45 minutes BL 10, SP 6, ST 36, LR 3, LI 4, BL 59 Patient tolerated procedure well

## 2025-01-31 NOTE — PROCEDURE
[de-identified] : Acupuncture treatment: Baldry technique with multiple needle placement to the lumbar paraspinal and gluteal musculature with UV lamp x 45 minutes Paralumbar chain (bladder meridian) with ES x 45 minutes K3-HT3 SI4-BL60 with ES x 45 minutes BL 10, SP 6, ST 36, LR 3, LI 4, BL 59 Patient tolerated procedure well

## 2025-02-04 NOTE — PHYSICAL EXAM
[de-identified] : Constitutional:   - No acute distress   - Obese    Neurological:   - normal mood and affect   - alert and oriented x 3       Cardiovascular:   - grossly normal  Lumbar Spine Exam:  Inspection: erythema (-) ecchymosis (-) rashes (-) alignment: no scoliosis  Palpation: paraspinal tenderness:                    L (-) ; R (-) thoracic paraspinal tenderness:       L (-) ; R (-) sciatic nerve tenderness :                L (-) ; R (+) SI joint tenderness:                         L (-) ; R (-) GTB tenderness:                             L (-); R (-)  ROM: WNL with mild stiffness Pain with extremes of extension and flexion  Strength: Right/Left Hip Flexion: (5/5) (5/5) Quadriceps: (5/5) (5/5) Hamstrings: (5/5) (5/5) Ankle Dorsiflexion: (5/5) (5/5) EHL: (5/5) (5/5) Ankle Plantarflexion: (5/5) (5/5)  Special Tests: SLR:                 R (+); L (=) Facet loading:  R (+); L (+) WILLIAN test:     R (n/a); L (n/a) Tight Hamstrings R (+); L (+)  Neurologic: Light touch intact throughout LE  Reflexes normal and symmetric  Gait: antalgic gait, unsteady  ambulates with assistance of walker  ------------------------------------------------------------------------------ Cervical Spine Exam:   Inspection:   erythema (-)   ecchymosis (-)   rashes (-)    Palpation:                                                    Cervical paraspinal tenderness:         R (+); L (+)  Upper trapezius tenderness:              R (-); L (-)  Rhomboids tenderness:                      R (-); L (-)  Occipital Ridge:                                   R (+); L (+)  Supraspinatus tenderness:                 R (-); L (-)   ROM:   Reduced ROM all planes; crepitus throughout ROM testing pain throughout range of motion testing  Strength Testing:              Deltoid                           R (5/5); L (5/5)  Biceps:                          R (5/5); L (5/5)  Triceps:                         R (5/5); L (5/5)  Finger Abductors:         R (5/5); L (5/5)  Grasp:                           R (5/5); L (5/5)   Special Testing:  Spurling Test:                  R (-); L (-)  Facet load test:               R (+); L (+)   Neuro:  SILT throughout right upper extremity  SILT throughout left upper extremity   Reflexes:  Biceps   -           R (2+); L (2+)  Triceps  -           R (2+); L (2+)  Brachioradialis- R (2+); L (2+)     No ankle clonus

## 2025-02-04 NOTE — ASSESSMENT
[FreeTextEntry1] : A discussion regarding available pain management treatment options occurred with the patient. These included interventional, rehabilitative, pharmacological, and alternative modalities. We will proceed with the following:  Interventional treatment options: - Proceed with right PM L5-S1 LESI (80 mg Kenalog) with fluoroscopic guidance - Patient has been counseled on several occasions regarding limitations of corticosteroid administration; we have suggested that she reserve injection therapies for episodes of severe pain exacerbations -she verbalizes understanding of this - Previously discussed realistic expectations of interventional pain therapies in the setting of multilevel central canal stenosis - see additional instructions below  Rehabilitative options: - continue physical therapy - encouraged participation in active HEP as tolerated  Medication based treatment options: - continue Tylenol 500-1000 mg up to TID on PRN basis - Continue meloxicam 7.5-15 mg daily as needed; counseled patient to discuss consistent NSAID use with her cardiologist - Continue Cymbalta 90 mg daily, prescribed by neurology - Failed previous trials of Lyrica and gabapentin - see additional instructions below  Complementary treatment options: - Weight management and lifestyle modifications discussed - Continue acupuncture therapy as directed  Behavioral pain treatment options: - Once again advised pain psychology evaluation; referral provided at prior visit - Patient was counseled regarding alternative coping strategies to deal with her chronic multifocal pain  Additional treatment recommendations as follows: - Previously discussed orthopedic evaluation for lumbar decompression; she defers given caretaker responsibilities for her  - Patient was counseled as to red flag signs and when to seek urgent care - Follow-up with neurology as directed (contemplating Botox therapy for chronic headache) - Follow up for indicated procedure or 3 months  We have discussed the risks, benefits, and alternatives NSAID therapy including but not limited to the risk of bleeding, thrombosis, gastric mucosal irritation/ulceration, allergic reaction and kidney dysfunction; the patient verbalizes an understanding.  The risks, benefits and alternatives of the proposed procedure were explained in detail with the patient. The risks outlined include but are not limited to infection, bleeding, post- dural puncture headache, nerve injury, a temporary increase in pain, failure to resolve symptoms, need for future interventions, allergic reaction, and possible elevation of blood sugar in diabetics if using corticosteroid.  All questions were answered to patient's apparent satisfaction, and he/she verbalized an understanding.

## 2025-02-04 NOTE — PHYSICAL EXAM
[de-identified] : Constitutional:   - No acute distress   - Obese    Neurological:   - normal mood and affect   - alert and oriented x 3       Cardiovascular:   - grossly normal  Lumbar Spine Exam:  Inspection: erythema (-) ecchymosis (-) rashes (-) alignment: no scoliosis  Palpation: paraspinal tenderness:                    L (-) ; R (-) thoracic paraspinal tenderness:       L (-) ; R (-) sciatic nerve tenderness :                L (-) ; R (+) SI joint tenderness:                         L (-) ; R (-) GTB tenderness:                             L (-); R (-)  ROM: WNL with mild stiffness Pain with extremes of extension and flexion  Strength: Right/Left Hip Flexion: (5/5) (5/5) Quadriceps: (5/5) (5/5) Hamstrings: (5/5) (5/5) Ankle Dorsiflexion: (5/5) (5/5) EHL: (5/5) (5/5) Ankle Plantarflexion: (5/5) (5/5)  Special Tests: SLR:                 R (+); L (=) Facet loading:  R (+); L (+) WILLIAN test:     R (n/a); L (n/a) Tight Hamstrings R (+); L (+)  Neurologic: Light touch intact throughout LE  Reflexes normal and symmetric  Gait: antalgic gait, unsteady  ambulates with assistance of walker  ------------------------------------------------------------------------------ Cervical Spine Exam:   Inspection:   erythema (-)   ecchymosis (-)   rashes (-)    Palpation:                                                    Cervical paraspinal tenderness:         R (+); L (+)  Upper trapezius tenderness:              R (-); L (-)  Rhomboids tenderness:                      R (-); L (-)  Occipital Ridge:                                   R (+); L (+)  Supraspinatus tenderness:                 R (-); L (-)   ROM:   Reduced ROM all planes; crepitus throughout ROM testing pain throughout range of motion testing  Strength Testing:              Deltoid                           R (5/5); L (5/5)  Biceps:                          R (5/5); L (5/5)  Triceps:                         R (5/5); L (5/5)  Finger Abductors:         R (5/5); L (5/5)  Grasp:                           R (5/5); L (5/5)   Special Testing:  Spurling Test:                  R (-); L (-)  Facet load test:               R (+); L (+)   Neuro:  SILT throughout right upper extremity  SILT throughout left upper extremity   Reflexes:  Biceps   -           R (2+); L (2+)  Triceps  -           R (2+); L (2+)  Brachioradialis- R (2+); L (2+)     No ankle clonus

## 2025-02-04 NOTE — HISTORY OF PRESENT ILLNESS
[Neck] : neck [7] : 7 [Radiating] : radiating [Sharp] : sharp [Constant] : constant [Household chores] : household chores [Leisure] : leisure [Sleep] : sleep [Meds] : meds [Physical therapy] : physical therapy [Sitting] : sitting [Standing] : standing [Walking] : walking [FreeTextEntry1] : 2025 - Patient presents for 8-week FUV. Patient reports multifocal pain once again.  Greatest concern today is in regard to her low back with radiation to the right lower extremity.  Feels that it is still currently manageable with a combination of physical therapy and acupuncture.  She is currently under the care of several subspecialist for various conditions including a neurologist for her chronic headaches.  Botox therapy was suggested, and she is contemplating this.  12/3/2024 - Patient presents for FUV after a L5-S1 LESI on 11/15/2024. Patient reports 65% relief of symptoms and improvement in functionality.  She reports she has not required medication since the procedure and is pleased with relief in her low back pain. She has been receiving acupuncture as well and finds that has provided significant relief to her low back pain.    Today however, her most bothersome area of pain today is her cervical pain and headaches.  She is currently undergoing treatment for vitamin B12 and vitamin D deficiencies.  She is interested in scheduling a repeat DARRELL with the hope of decreasing her pain and increasing her functionality and quality of life.   10/25/2024 - Patient presents for a FUV after a bilateral occipital nerve block with U/S guidance on 2024. Patient reports mild improvement in her headaches at this time however, she has been addressing her cervical spine and finds that has been contributing to her relief.  She was seen by neurology who determined the HAs were generating from her neck and she now has been in physical therapy 3 times/week for the past 14 weeks and will be initiating acupuncture in the next few weeks as well. Of note, patient established care with a new cardiologist and is now on a new medication regimen and reports better controlled hypertension.  Today. her most bothersome symptom is her low back pain with radiation to bilateral lower extremities. She reports burning, numbness and tingling to bilateral feet. Presents today to discuss next steps in her treatment plan for her low back pain with the hope of decreasing her pain and increasing her functionality and quality of life.   2024 - Patient presents for FUV after a bilateral occipital nerve block on 2024. Patient reports a significant reduction in the frequency and intensity of her headaches for about 3-4 days s/p occipital block.  Unfortunately, she experienced a return of symptoms essentially to baseline.  She does continue to have headaches from which she thinks could possibly related to uncontrolled hypertension.  She has been seeing cardiology and will follow up with neurology.  2024 - Patient presents for FUV after a L5-S1 LESI on 2024. Patient reports moderate reduction of her lower back and right leg pain following the epidural.  Unfortunately, patient was seen in the ER 3 days post procedure for what she describes as severe hypertension and ongoing headache.  She was evaluated and discharged home.  Patient has been dealing with headaches for quite some time now without definitive source.  Has seen her longtime neurologist Dr. Kellerman and recently had an evaluation with Dr. Salmon ([facility]) who ordered repeat imaging of the C/T/L spine as well as brain.  Headache severity is greatest at the bilateral occiput with radiation to the top of her head.  2024 - Patient presents for FUV regarding their lower back pain.  She was last seen approximately 6 months ago.  Patient reports sustained relief following her previous lumbar epidural steroid injection which lasted until approximately 1 month ago.  Now has a return of lower back with radiation down the posterior right leg stopping at the posterior calf.  Additionally, she reports a flareup of her peripheral neuropathy as per her treating neurologist.  A course of intravenous corticosteroid was suggested and completed about 5 weeks ago.  Takes Cymbalta 60 mg and Tylenol daily with no benefit; she has discontinued Lyrica due to no perceived benefit.  Of note she is undergoing further neurological workup and will be undergoing a spinal tap this week.  2023 - Patient presents for FUV after a L5-S1 LESI on 2023. Patient reports a complete resolution of pain s/p injection, she is pleased with the results of the epidural; she continues to take 100 mg of Lyrica BID.   10/26/2023 - Patient presents for FUV regarding their lower back pain.   Patient reports radiation to the right lower extremity extending to the foot.  Currently has titrated to a dose of Lyrica 200 mg daily which has reduced some of the "neuropathy sensations". She also complains of additional multifocal joint pain; greatest of which is in her right knee.  She sees Dr. Nguyen for this.  2023- Patient presents for FUV regarding their lower back pain. Patient had started taking Tramadol and felt sick with nausea and sweating days after using, she has since discontinued use; she no longer gets IV steroid infusions as well.  Still with complaints in the low back with radiation to the right lower extremity extending to the ankle/foot.  Worst pain is concentrated in the right lateral aspect of the calf. Greatest degree of difficulty with ambulation.  2023 - Patient presents for FUV regarding their lower back pain.  Patient did not start the Gralise  financial constraints.  She is following with a neurologist for IV steroid infusions for tx of peripheral neuropathy.  Patient reports she gets right sided arm numbness/tingling and right foot paranesthesia's.  She is becoming frustrated by her multitude of pain issues.  She is dealing with concurrent neck, low back, knee, and neurological issues.  2023 - Patient presents to review lumbar spine MRI from 2023. The majority of the patient's pain is in the right foot, and occasionally the first digit on the left foot, there is no pain in the rest of the lower extremities, she has back pain, but it is not as prominent as the right foot pain.  Has seen neurology in the past and obtained EMG.  Planning for follow-up in upcoming week.  2023 - Patient presents for FUV after a L5-S1 LESI on 2023.  Patient reports overall 50% relief sustained following the injection.  Patient reports her pain returned to baseline after about 2 weeks. She complains of primarily axial lower back pain specifically with prolonged standing.  2023 - Patient presents for FUV after a right PM C7-T1 DARRELL on 2023. Patient reports significant pain reduction following the injection (60% reduction in pain).  She is pleased with her response.  Patient's main complaint today is her axial lower back pain that has radiation to the medial right thigh, she had taken a trip to [place] recently for a week where she did extensive walking and exacerbated her back pain; she can endure 5-10 minutes of standing before it becomes unbearable.  Has had previous neurosurgical evaluations and surgery was recommended however she defers.  2023 - Patient presents for reevaluation.  She was last seen approximately 6 months ago.  Patient to the office regarding her neck pain. Patient was referred by Dr. Arevalo. Patient was originally seen for her back in September. Patient was placed on MDP for her right upper extremity radicular pain with good relief. Patient complains of right rotation stiffness in regards to her neck. Patient here for possible interventional management of cervical region ridiculous.  Taking dual action Advil once sometimes twice daily.  2022 - The patient presents for initial evaluation regarding their low back pain. Patient was referred by Dr. Nguyen. Patient sustained a slip and fall in 2021 and landed on her buttocks. Significant flare-up in her lower back since fall.  Experiences numbness in her toes and fingertips get tingling sensation when she closes her fists. Patient has gotten trigger point injections with no relief. Leg symptoms improve when seated. Diclofenac provides relief, previously tried Celebrex and Gabapentin - no relief.   Injections: 1) C7-T1 DARRELL (2023) 2) L5-S1 LESI (2023, 2023, 2024, 11/15/2024) 3) Bilateral occipital nerve block with U/S guidance - (2024, 2024)   Pertinent Surgical History: N/A   Imagin) MRI Lumbar Spine (2024) - [facility]  2) MRI Thoracic Spine (2024) - [facility]  3) MRI Cervical Spine (2024)- [facility]  Electrodiagnostic Testin) Lower Extremity NCV/EMG - (2022) - [facility]  1. 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 left peroneal motor and the right peroneal motor nerves showed reduced amplitude and decreased conduction velocity. The left tibial motor nerve showed decreased conduction velocity. The right tibial motor nerve showed reduced amplitude. The left sural sensory and right sural sensory nerves were unremarkable.  3) Upper Extremity NCV/EMG - (22) - [facility]  1. very mild bilateral median nerve lesions at the wrists 2. very mild bilateral ulnar nerve lesion at the elbows.   Physician Disclaimer: I have personally reviewed and confirmed all HPI data with the patient.  [] : Post Surgical Visit: no [FreeTextEntry7] : RT SHOULDER [de-identified] : C MRI AT

## 2025-02-04 NOTE — HISTORY OF PRESENT ILLNESS
[Neck] : neck [7] : 7 [Radiating] : radiating [Sharp] : sharp [Constant] : constant [Household chores] : household chores [Leisure] : leisure [Sleep] : sleep [Meds] : meds [Physical therapy] : physical therapy [Sitting] : sitting [Standing] : standing [Walking] : walking [FreeTextEntry1] : 2025 - Patient presents for 8-week FUV. Patient reports multifocal pain once again.  Greatest concern today is in regard to her low back with radiation to the right lower extremity.  Feels that it is still currently manageable with a combination of physical therapy and acupuncture.  She is currently under the care of several subspecialist for various conditions including a neurologist for her chronic headaches.  Botox therapy was suggested, and she is contemplating this.  12/3/2024 - Patient presents for FUV after a L5-S1 LESI on 11/15/2024. Patient reports 65% relief of symptoms and improvement in functionality.  She reports she has not required medication since the procedure and is pleased with relief in her low back pain. She has been receiving acupuncture as well and finds that has provided significant relief to her low back pain.    Today however, her most bothersome area of pain today is her cervical pain and headaches.  She is currently undergoing treatment for vitamin B12 and vitamin D deficiencies.  She is interested in scheduling a repeat DARRELL with the hope of decreasing her pain and increasing her functionality and quality of life.   10/25/2024 - Patient presents for a FUV after a bilateral occipital nerve block with U/S guidance on 2024. Patient reports mild improvement in her headaches at this time however, she has been addressing her cervical spine and finds that has been contributing to her relief.  She was seen by neurology who determined the HAs were generating from her neck and she now has been in physical therapy 3 times/week for the past 14 weeks and will be initiating acupuncture in the next few weeks as well. Of note, patient established care with a new cardiologist and is now on a new medication regimen and reports better controlled hypertension.  Today. her most bothersome symptom is her low back pain with radiation to bilateral lower extremities. She reports burning, numbness and tingling to bilateral feet. Presents today to discuss next steps in her treatment plan for her low back pain with the hope of decreasing her pain and increasing her functionality and quality of life.   2024 - Patient presents for FUV after a bilateral occipital nerve block on 2024. Patient reports a significant reduction in the frequency and intensity of her headaches for about 3-4 days s/p occipital block.  Unfortunately, she experienced a return of symptoms essentially to baseline.  She does continue to have headaches from which she thinks could possibly related to uncontrolled hypertension.  She has been seeing cardiology and will follow up with neurology.  2024 - Patient presents for FUV after a L5-S1 LESI on 2024. Patient reports moderate reduction of her lower back and right leg pain following the epidural.  Unfortunately, patient was seen in the ER 3 days post procedure for what she describes as severe hypertension and ongoing headache.  She was evaluated and discharged home.  Patient has been dealing with headaches for quite some time now without definitive source.  Has seen her longtime neurologist Dr. Kellerman and recently had an evaluation with Dr. Salmon ([facility]) who ordered repeat imaging of the C/T/L spine as well as brain.  Headache severity is greatest at the bilateral occiput with radiation to the top of her head.  2024 - Patient presents for FUV regarding their lower back pain.  She was last seen approximately 6 months ago.  Patient reports sustained relief following her previous lumbar epidural steroid injection which lasted until approximately 1 month ago.  Now has a return of lower back with radiation down the posterior right leg stopping at the posterior calf.  Additionally, she reports a flareup of her peripheral neuropathy as per her treating neurologist.  A course of intravenous corticosteroid was suggested and completed about 5 weeks ago.  Takes Cymbalta 60 mg and Tylenol daily with no benefit; she has discontinued Lyrica due to no perceived benefit.  Of note she is undergoing further neurological workup and will be undergoing a spinal tap this week.  2023 - Patient presents for FUV after a L5-S1 LESI on 2023. Patient reports a complete resolution of pain s/p injection, she is pleased with the results of the epidural; she continues to take 100 mg of Lyrica BID.   10/26/2023 - Patient presents for FUV regarding their lower back pain.   Patient reports radiation to the right lower extremity extending to the foot.  Currently has titrated to a dose of Lyrica 200 mg daily which has reduced some of the "neuropathy sensations". She also complains of additional multifocal joint pain; greatest of which is in her right knee.  She sees Dr. Nguyen for this.  2023- Patient presents for FUV regarding their lower back pain. Patient had started taking Tramadol and felt sick with nausea and sweating days after using, she has since discontinued use; she no longer gets IV steroid infusions as well.  Still with complaints in the low back with radiation to the right lower extremity extending to the ankle/foot.  Worst pain is concentrated in the right lateral aspect of the calf. Greatest degree of difficulty with ambulation.  2023 - Patient presents for FUV regarding their lower back pain.  Patient did not start the Gralise  financial constraints.  She is following with a neurologist for IV steroid infusions for tx of peripheral neuropathy.  Patient reports she gets right sided arm numbness/tingling and right foot paranesthesia's.  She is becoming frustrated by her multitude of pain issues.  She is dealing with concurrent neck, low back, knee, and neurological issues.  2023 - Patient presents to review lumbar spine MRI from 2023. The majority of the patient's pain is in the right foot, and occasionally the first digit on the left foot, there is no pain in the rest of the lower extremities, she has back pain, but it is not as prominent as the right foot pain.  Has seen neurology in the past and obtained EMG.  Planning for follow-up in upcoming week.  2023 - Patient presents for FUV after a L5-S1 LESI on 2023.  Patient reports overall 50% relief sustained following the injection.  Patient reports her pain returned to baseline after about 2 weeks. She complains of primarily axial lower back pain specifically with prolonged standing.  2023 - Patient presents for FUV after a right PM C7-T1 DARRELL on 2023. Patient reports significant pain reduction following the injection (60% reduction in pain).  She is pleased with her response.  Patient's main complaint today is her axial lower back pain that has radiation to the medial right thigh, she had taken a trip to [place] recently for a week where she did extensive walking and exacerbated her back pain; she can endure 5-10 minutes of standing before it becomes unbearable.  Has had previous neurosurgical evaluations and surgery was recommended however she defers.  2023 - Patient presents for reevaluation.  She was last seen approximately 6 months ago.  Patient to the office regarding her neck pain. Patient was referred by Dr. Arevalo. Patient was originally seen for her back in September. Patient was placed on MDP for her right upper extremity radicular pain with good relief. Patient complains of right rotation stiffness in regards to her neck. Patient here for possible interventional management of cervical region ridiculous.  Taking dual action Advil once sometimes twice daily.  2022 - The patient presents for initial evaluation regarding their low back pain. Patient was referred by Dr. Nguyen. Patient sustained a slip and fall in 2021 and landed on her buttocks. Significant flare-up in her lower back since fall.  Experiences numbness in her toes and fingertips get tingling sensation when she closes her fists. Patient has gotten trigger point injections with no relief. Leg symptoms improve when seated. Diclofenac provides relief, previously tried Celebrex and Gabapentin - no relief.   Injections: 1) C7-T1 DARRELL (2023) 2) L5-S1 LESI (2023, 2023, 2024, 11/15/2024) 3) Bilateral occipital nerve block with U/S guidance - (2024, 2024)   Pertinent Surgical History: N/A   Imagin) MRI Lumbar Spine (2024) - [facility]  2) MRI Thoracic Spine (2024) - [facility]  3) MRI Cervical Spine (2024)- [facility]  Electrodiagnostic Testin) Lower Extremity NCV/EMG - (2022) - [facility]  1. 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 left peroneal motor and the right peroneal motor nerves showed reduced amplitude and decreased conduction velocity. The left tibial motor nerve showed decreased conduction velocity. The right tibial motor nerve showed reduced amplitude. The left sural sensory and right sural sensory nerves were unremarkable.  3) Upper Extremity NCV/EMG - (22) - [facility]  1. very mild bilateral median nerve lesions at the wrists 2. very mild bilateral ulnar nerve lesion at the elbows.   Physician Disclaimer: I have personally reviewed and confirmed all HPI data with the patient.  [] : Post Surgical Visit: no [FreeTextEntry7] : RT SHOULDER [de-identified] : C MRI AT

## 2025-02-06 NOTE — PROCEDURE
[de-identified] : Acupuncture treatment: Baldry technique with multiple needle placement to the lumbar paraspinal and gluteal musculature with UV lamp x 45 minutes Paralumbar chain (bladder meridian) with ES x 45 minutes K3-HT3 SI4-BL60 with ES x 45 minutes BL 10, SP 6, ST 36, LR 3, LI 4, BL 59 Patient tolerated procedure well

## 2025-02-13 NOTE — PROCEDURE
[de-identified] : Acupuncture treatment: Baldry technique with multiple needle placement to the lumbar paraspinal and gluteal musculature with UV lamp x 45 minutes Paralumbar chain (bladder meridian) with ES x 45 minutes K3-HT3 SI4-BL60 with ES x 45 minutes BL 10, SP 6, ST 36, LR 3, LI 4, BL 59 Patient tolerated procedure well

## 2025-02-20 NOTE — PROCEDURE
[de-identified] : Acupuncture treatment: Baldry technique with multiple needle placement to the lumbar paraspinal and gluteal musculature with UV lamp x 45 minutes Paralumbar chain (bladder meridian) with ES x 45 minutes K3-HT3 SI4-BL60 with ES x 45 minutes BL 10, SP 6, ST 36, LR 3, LI 4, BL 59 Patient tolerated procedure well

## 2025-02-24 NOTE — HISTORY OF PRESENT ILLNESS
[de-identified] : 02/24/2025: Pt here today for follow-up for bilateral knee pain. Pain and symptoms are worsening, patient wishes to proceed with Euflexxa series #1 for bilateral knees.  01/22/2025: Pt here today for follow-up right knee. Pain and symptoms are worse. Since last visit, pt said she had her right knee buckle today while doing laundry and has had swelling since. Today she is here to discuss whether she can get a CSI or a knee brace to help her pain.  12/09/2024: Patient here to start Visco-3 series injection #2 left knee and right thumb  12/2/24: Patient here to start Visco-3 series injection #1 left knee and right thumb. ([facility] program)   10/28/24: Pt here today for follow-up for bilateral knee pain and right hand pain. Pain and symptoms are better overall. Since last visit patient received euflexxa gel series, which she had some relief for 3 months. Today c/o pain in the back of the knee and difficulty w/ knee flexion.    8/12/24: Here for Euflexxa inj #3 B/L knees  8/5/24: Here for Euflexxa inj #2 B/L knees  7/29/24: Here for Euflexxa #1 bilateral knees  7/15/24: Here for follow up of B/L knees. Continues to have pain in knees. Currently using a walker for ambulation.

## 2025-02-24 NOTE — PROCEDURE
[FreeTextEntry3] : The risks, benefits, and alternatives to viscosupplementation injection were reviewed with the patient. Risks outlined include but are not limited to infection, sepsis, bleeding, scarring, temporary increase in pain, syncopal episode, failure to resolve symptoms, symptoms recurrence, allergic reaction, flare reaction, pseudoseptic reaction.  Patient understood the risks and asked to proceed with this treatment course.   Large joint injection was performed of the RIGHT and LEFT knee. The indication for this procedure was pain, inflammation and x-ray evidence of Osteoarthritis on this or prior visit. The site was prepped with alcohol, betadine, ethyl chloride sprayed topically and sterile technique used. A 2mL injection of Euflexxa 10mg/mL, series # 1 was used.   Patient tolerated procedure well. Patient was advised to call if redness, pain or fever occur and apply ice for 15 minutes out of every hour for the next 12-24 hours as tolerated.   Patient has tried OTC's including aspirin, Ibuprofen, Aleve, etc or prescription NSAIDS, and/or exercises at home and/or physical therapy without satisfactory response, patient had decreased mobility in the joint and the risks benefits, and alternatives have been discussed, and verbal consent was obtained.   Visualization of the needle and placement of injection was performed without complication.

## 2025-02-24 NOTE — IMAGING
[Right] : right knee [There are no fractures, subluxations or dislocations. No significant abnormalities are seen] : There are no fractures, subluxations or dislocations. No significant abnormalities are seen [advanced tricompartmental OA with medial compartment narrowing and varus alignment] : advanced tricompartmental OA with medial compartment narrowing and varus alignment [de-identified] : Constitutional: The patient appears well developed, well nourished. Skin: No impressive skin lesions present, except as noted in detailed exam. Lymphatic: No palpable lymphadenopathy in examined body areas. Neurologic: Alert and oriented to time, place and person.   RIGHT KNEE:  Inspection: mild effusion Palpation: medial joint line tenderness, anterior tenderness Knee Range of Motion: 3-125 Strength: 5/5 Quadriceps strength, 5/5 Hamstring strength Neurological: light touch is intact throughout Ligament Stability and Special Tests: McMurrays: neg Lachman: neg Pivot Shift: neg Posterior Drawer: neg Valgus: neg Varus: neg Patella Apprehension: neg Patella Maltracking: neg   LEFT KNEE:  Inspection: mild effusion Palpation: medial joint line tenderness, anterior tenderness Knee Range of Motion: 3-125 Strength: 5/5 Quadriceps strength, 5/5 Hamstring strength Neurological: light touch is intact throughout Ligament Stability and Special Tests: McMurrays: neg Lachman: neg Pivot Shift: neg Posterior Drawer: neg Valgus: neg Varus: neg Patella Apprehension: neg Patella Maltracking: neg

## 2025-02-24 NOTE — DISCUSSION/SUMMARY
[de-identified] : The natural progression of osteoarthritis was explained to the patient. Dx is OA in bilateral knees and right hand. We discussed the possibility of treatment options from conservative to operative.  These included NSAIDS, Glucosamine and Chondrotin sulfate, and Physical Therapy as well different types of injections.  We also discussed that at some point they may progress to needed a TKA, not needed at this time. Information and pamphlets were given.  RIGHT KNEE: 1. Pt tolerated procedure well, f/u in 1 week for Euflexxa # 2  LEFT KNEE:  1. Pt tolerated procedure well, f/u in 1 week for Euflexxa # 2   Follow up: 1 week for Euflexxa # 2    Dx / Natural History: The patient was advised of the diagnosis.  The natural history of the pathology was explained in full to the patient in layman's terms.  Several different treatment options were discussed and explained in full to the patient including the risks and benefits of both surgical and non-surgical treatments.  All questions and concerns were answered.   Pain Guide Activities: The patient was advised to let pain guide the gradual advancement of activities.  Physical Therapy: The patient was provided with a prescription for Physical Therapy.  Icing: The patient was advised to apply ice (wrapped in a towel or protective covering) to the area daily (20 minutes at a time, 2-4X/day).

## 2025-02-25 NOTE — PROCEDURE
[de-identified] : Acupuncture treatment: Baldry technique with multiple needle placement to the lumbar paraspinal and gluteal musculature with UV lamp x 45 minutes Paralumbar chain (bladder meridian) with ES x 45 minutes K3-HT3 SI4-BL60 with ES x 45 minutes BL 10, SP 6, ST 36, LR 3, LI 4, BL 59 Patient tolerated procedure well

## 2025-03-04 NOTE — PROCEDURE
[FreeTextEntry3] : The risks, benefits, and alternatives to viscosupplementation injection were reviewed with the patient. Risks outlined include but are not limited to infection, sepsis, bleeding, scarring, temporary increase in pain, syncopal episode, failure to resolve symptoms, symptoms recurrence, allergic reaction, flare reaction, pseudoseptic reaction.  Patient understood the risks and asked to proceed with this treatment course.   Large joint injection was performed of the RIGHT and LEFT knee. The indication for this procedure was pain, inflammation and x-ray evidence of Osteoarthritis on this or prior visit. The site was prepped with alcohol, betadine, ethyl chloride sprayed topically and sterile technique used. A 2mL injection of Euflexxa 10mg/mL, series # 2 was used.   Patient tolerated procedure well. Patient was advised to call if redness, pain or fever occur and apply ice for 15 minutes out of every hour for the next 12-24 hours as tolerated.   Patient has tried OTC's including aspirin, Ibuprofen, Aleve, etc or prescription NSAIDS, and/or exercises at home and/or physical therapy without satisfactory response, patient had decreased mobility in the joint and the risks benefits, and alternatives have been discussed, and verbal consent was obtained.   Visualization of the needle and placement of injection was performed without complication.

## 2025-03-04 NOTE — PROCEDURE
[de-identified] : Acupuncture treatment: Baldry technique with multiple needle placement to the lumbar paraspinal and gluteal musculature with UV lamp x 45 minutes Paralumbar chain (bladder meridian) with ES x 45 minutes K3-HT3 SI4-BL60 with ES x 45 minutes BL 10, SP 6, ST 36, LR 3, LI 4, BL 59 Patient tolerated procedure well

## 2025-03-04 NOTE — DISCUSSION/SUMMARY
[de-identified] : Left X-Ray Examination of the SHOULDER (2 views): no fractures, subluxations or dislocations. RTC Arthropathy seen. Left X-Ray Examination of the SCAPULA 1 or 2 views shows: no significant abnormalities.  X-Ray Examination of the LUMBAR SPINE 2 views shows: straightening consistent with spasm, disc space narrowing. Spondylolisthesis seen. X-Ray Examination of the THORACIC SPINE 2 views shows: straightening consistent with spasm, disc space narrowing. Right X-Ray Examination of the HAND 3 views: no fractures, subluxations or dislocations. Basal joint arthritis seen.    - Patient states she had a recent fall and onto concrete outside her home on 2/28.   BILATERAL KNEES: - The natural progression of osteoarthritis was explained to the patient. Dx is OA in bilateral knees and right hand. We discussed the possibility of treatment options from conservative to operative.  These included NSAIDS, Glucosamine and Chondrotin sulfate, and Physical Therapy as well different types of injections.  We also discussed that at some point they may progress to needed a TKA, not needed at this time. Information and pamphlets were given. - Pt received b/l Euflexxa #2, tolerated procedure well, f/u in 1 week for Euflexxa # 3   RIGHT HAND: - Continue brace at all times. - The patient was advised to apply ice (wrapped in a towel or protective covering) to the area daily - 20 minutes at a time, 2-4x/day. - Patient will return for gel inj.    LEFT SHOULDER rotator cuff arthropathy: - We will continue conservative treatment with icing, and anti-inflammatory medications. - Discussed CSI and patient will delay at this time due to upcoming epidural injection.  - The patient was advised to apply ice (wrapped in a towel or protective covering) to the area daily - 20 minutes at a time, 2-4x/day.   SPINE: - We discussed their diagnosis and treatment options at length including the risks and benefits of both surgical and non-surgical options. - We will continue conservative treatment. - Thoracic spine MRI referral and lumbar spine MRI referral eval for compression fracture.   Follow up: 1 week for Euflexxa # 2 and with MRIs   Dx / Natural History: The patient was advised of the diagnosis.  The natural history of the pathology was explained in full to the patient in layman's terms.  Several different treatment options were discussed and explained in full to the patient including the risks and benefits of both surgical and non-surgical treatments.  All questions and concerns were answered.   Pain Guide Activities: The patient was advised to let pain guide the gradual advancement of activities.  Physical Therapy: The patient was provided with a prescription for Physical Therapy.  Icing: The patient was advised to apply ice (wrapped in a towel or protective covering) to the area daily (20 minutes at a time, 2-4X/day).

## 2025-03-04 NOTE — IMAGING
[de-identified] : Constitutional: The patient appears well developed, well nourished. Skin: No impressive skin lesions present, except as noted in detailed exam. Lymphatic: No palpable lymphadenopathy in examined body areas. Neurologic: Alert and oriented to time, place and person.   RIGHT KNEE:  Inspection: mild effusion Palpation: medial joint line tenderness, anterior tenderness Knee Range of Motion: 3-125 Strength: 5/5 Quadriceps strength, 5/5 Hamstring strength Neurological: light touch is intact throughout Ligament Stability and Special Tests: McMurrays: neg Lachman: neg Pivot Shift: neg Posterior Drawer: neg Valgus: neg Varus: neg Patella Apprehension: neg Patella Maltracking: neg   LEFT KNEE:  Inspection: mild effusion Palpation: medial joint line tenderness, anterior tenderness Knee Range of Motion: 3-125 Strength: 5/5 Quadriceps strength, 5/5 Hamstring strength Neurological: light touch is intact throughout Ligament Stability and Special Tests: McMurrays: neg Lachman: neg Pivot Shift: neg Posterior Drawer: neg Valgus: neg Varus: neg Patella Apprehension: neg Patella Maltracking: neg    LEFT SHOULDER Inspection: No swelling.  Palpation: Tenderness is noted at the bicipital groove, anterior and lateral.  Range of motion: There is pain with range of motion. , ER 55, @90ER 90, @90IR 30 Strength: There is pain, weakness, and discomfort with strength testing. Forward Flexion 3/5. Abduction 3/5. External Rotation 4/5 and Internal Rotation 5-/5  Neurological testings: motor and sensor intact distally. Ligament Stability and Special Tests:  There is positive arc of pain.  Shoulder apprehension: neg Shoulder relocation: neg Obriens test: pos Biceps Active test: neg Mix Labral Shear: neg Impingement testing: pos Chris testing: pos Whipple: pos Cross Body Adduction: neg   RIGHT WRIST and HAND Inspection: No erythema, No swelling Palpation: TTP over CMC Range of Motion: Full range of motion. Strength: normal Neurological testing: motor exam 5/5 and light touch intact. Special Testing:   Back / Spine: Inspection: No erythema and ecchymosis. Palpation: left lumbar paraspinal tenderness. Tender to palpation over the mid thoracic spine Range of motion: Near full range of motion but with mild stiffness. Pain with lateral rotation. pain at extremes of flexion Strength Testing: Weakness with left ankle dorsiflexion and EHL strength Otherwise 5/5 throughout both lower extremities with normal tone Neurological testing: light touch is intact throughout both lower extremities Straight Leg Raise: positive left / right Babiniski test: neg

## 2025-03-04 NOTE — IMAGING
[de-identified] : Constitutional: The patient appears well developed, well nourished. Skin: No impressive skin lesions present, except as noted in detailed exam. Lymphatic: No palpable lymphadenopathy in examined body areas. Neurologic: Alert and oriented to time, place and person.   RIGHT KNEE:  Inspection: mild effusion Palpation: medial joint line tenderness, anterior tenderness Knee Range of Motion: 3-125 Strength: 5/5 Quadriceps strength, 5/5 Hamstring strength Neurological: light touch is intact throughout Ligament Stability and Special Tests: McMurrays: neg Lachman: neg Pivot Shift: neg Posterior Drawer: neg Valgus: neg Varus: neg Patella Apprehension: neg Patella Maltracking: neg   LEFT KNEE:  Inspection: mild effusion Palpation: medial joint line tenderness, anterior tenderness Knee Range of Motion: 3-125 Strength: 5/5 Quadriceps strength, 5/5 Hamstring strength Neurological: light touch is intact throughout Ligament Stability and Special Tests: McMurrays: neg Lachman: neg Pivot Shift: neg Posterior Drawer: neg Valgus: neg Varus: neg Patella Apprehension: neg Patella Maltracking: neg    LEFT SHOULDER Inspection: No swelling.  Palpation: Tenderness is noted at the bicipital groove, anterior and lateral.  Range of motion: There is pain with range of motion. , ER 55, @90ER 90, @90IR 30 Strength: There is pain, weakness, and discomfort with strength testing. Forward Flexion 3/5. Abduction 3/5. External Rotation 4/5 and Internal Rotation 5-/5  Neurological testings: motor and sensor intact distally. Ligament Stability and Special Tests:  There is positive arc of pain.  Shoulder apprehension: neg Shoulder relocation: neg Obriens test: pos Biceps Active test: neg Mix Labral Shear: neg Impingement testing: pos Chris testing: pos Whipple: pos Cross Body Adduction: neg   RIGHT WRIST and HAND Inspection: No erythema, No swelling Palpation: TTP over CMC Range of Motion: Full range of motion. Strength: normal Neurological testing: motor exam 5/5 and light touch intact. Special Testing:   Back / Spine: Inspection: No erythema and ecchymosis. Palpation: left lumbar paraspinal tenderness. Tender to palpation over the mid thoracic spine Range of motion: Near full range of motion but with mild stiffness. Pain with lateral rotation. pain at extremes of flexion Strength Testing: Weakness with left ankle dorsiflexion and EHL strength Otherwise 5/5 throughout both lower extremities with normal tone Neurological testing: light touch is intact throughout both lower extremities Straight Leg Raise: positive left / right Babiniski test: neg

## 2025-03-04 NOTE — DISCUSSION/SUMMARY
[de-identified] : Left X-Ray Examination of the SHOULDER (2 views): no fractures, subluxations or dislocations. RTC Arthropathy seen. Left X-Ray Examination of the SCAPULA 1 or 2 views shows: no significant abnormalities.  X-Ray Examination of the LUMBAR SPINE 2 views shows: straightening consistent with spasm, disc space narrowing. Spondylolisthesis seen. X-Ray Examination of the THORACIC SPINE 2 views shows: straightening consistent with spasm, disc space narrowing. Right X-Ray Examination of the HAND 3 views: no fractures, subluxations or dislocations. Basal joint arthritis seen.    - Patient states she had a recent fall and onto concrete outside her home on 2/28.   BILATERAL KNEES: - The natural progression of osteoarthritis was explained to the patient. Dx is OA in bilateral knees and right hand. We discussed the possibility of treatment options from conservative to operative.  These included NSAIDS, Glucosamine and Chondrotin sulfate, and Physical Therapy as well different types of injections.  We also discussed that at some point they may progress to needed a TKA, not needed at this time. Information and pamphlets were given. - Pt received b/l Euflexxa #2, tolerated procedure well, f/u in 1 week for Euflexxa # 3   RIGHT HAND: - Continue brace at all times. - The patient was advised to apply ice (wrapped in a towel or protective covering) to the area daily - 20 minutes at a time, 2-4x/day. - Patient will return for gel inj.    LEFT SHOULDER rotator cuff arthropathy: - We will continue conservative treatment with icing, and anti-inflammatory medications. - Discussed CSI and patient will delay at this time due to upcoming epidural injection.  - The patient was advised to apply ice (wrapped in a towel or protective covering) to the area daily - 20 minutes at a time, 2-4x/day.   SPINE: - We discussed their diagnosis and treatment options at length including the risks and benefits of both surgical and non-surgical options. - We will continue conservative treatment. - Thoracic spine MRI referral and lumbar spine MRI referral eval for compression fracture.   Follow up: 1 week for Euflexxa # 2 and with MRIs   Dx / Natural History: The patient was advised of the diagnosis.  The natural history of the pathology was explained in full to the patient in layman's terms.  Several different treatment options were discussed and explained in full to the patient including the risks and benefits of both surgical and non-surgical treatments.  All questions and concerns were answered.   Pain Guide Activities: The patient was advised to let pain guide the gradual advancement of activities.  Physical Therapy: The patient was provided with a prescription for Physical Therapy.  Icing: The patient was advised to apply ice (wrapped in a towel or protective covering) to the area daily (20 minutes at a time, 2-4X/day).

## 2025-03-04 NOTE — HISTORY OF PRESENT ILLNESS
[de-identified] : 3/03/2025: Patient presents today for Euflexxa #2 for bilateral knees. Patient presents with new complaint, reports she fell onto concrete coming out of her home on 2/28/25 experiencing pain in right hand, upper and lower back and left shoulder.   02/24/2025: Pt here today for follow-up for bilateral knee pain. Pain and symptoms are worsening, patient wishes to proceed with Euflexxa series #1 for bilateral knees.  01/22/2025: Pt here today for follow-up right knee. Pain and symptoms are worse. Since last visit, pt said she had her right knee buckle today while doing laundry and has had swelling since. Today she is here to discuss whether she can get a CSI or a knee brace to help her pain.  12/09/2024: Patient here to start Visco-3 series injection #2 left knee and right thumb  12/2/24: Patient here to start Visco-3 series injection #1 left knee and right thumb. ([facility] program)   10/28/24: Pt here today for follow-up for bilateral knee pain and right hand pain. Pain and symptoms are better overall. Since last visit patient received euflexxa gel series, which she had some relief for 3 months. Today c/o pain in the back of the knee and difficulty w/ knee flexion.    8/12/24: Here for Euflexxa inj #3 B/L knees  8/5/24: Here for Euflexxa inj #2 B/L knees  7/29/24: Here for Euflexxa #1 bilateral knees  7/15/24: Here for follow up of B/L knees. Continues to have pain in knees. Currently using a walker for ambulation.

## 2025-03-04 NOTE — HISTORY OF PRESENT ILLNESS
[de-identified] : 3/03/2025: Patient presents today for Euflexxa #2 for bilateral knees. Patient presents with new complaint, reports she fell onto concrete coming out of her home on 2/28/25 experiencing pain in right hand, upper and lower back and left shoulder.   02/24/2025: Pt here today for follow-up for bilateral knee pain. Pain and symptoms are worsening, patient wishes to proceed with Euflexxa series #1 for bilateral knees.  01/22/2025: Pt here today for follow-up right knee. Pain and symptoms are worse. Since last visit, pt said she had her right knee buckle today while doing laundry and has had swelling since. Today she is here to discuss whether she can get a CSI or a knee brace to help her pain.  12/09/2024: Patient here to start Visco-3 series injection #2 left knee and right thumb  12/2/24: Patient here to start Visco-3 series injection #1 left knee and right thumb. ([facility] program)   10/28/24: Pt here today for follow-up for bilateral knee pain and right hand pain. Pain and symptoms are better overall. Since last visit patient received euflexxa gel series, which she had some relief for 3 months. Today c/o pain in the back of the knee and difficulty w/ knee flexion.    8/12/24: Here for Euflexxa inj #3 B/L knees  8/5/24: Here for Euflexxa inj #2 B/L knees  7/29/24: Here for Euflexxa #1 bilateral knees  7/15/24: Here for follow up of B/L knees. Continues to have pain in knees. Currently using a walker for ambulation.

## 2025-03-07 NOTE — PROCEDURE
[FreeTextEntry3] : Date of Service: 03/07/2025   Account: [id number]  Patient: ISMAEL WALDEN   YOB: 1950  Age: 74 year  Surgeon:      Alverto Barney DO  Assistant:    None  Pre-Operative Diagnosis:         Lumbosacral Radiculitis (M54.17)  Post Operative Diagnosis:       Lumbosacral Radiculitis (M54.17)     Procedure:             Lumbar interlaminar (L5-S1) epidural steroid injection under fluoroscopic guidance  Anesthesia:            MAC  This procedure was carried out using fluoroscopic guidance.  The risks and benefits of the procedure were discussed extensively with the patient.  The consent of the patient was obtained and the following procedure was performed. A timeout was performed with all essential staff present and the site and side were verified.  The patient was placed in the prone position with a pillow under the abdomen to minimize the lumbar lordosis.  The lumbar area was prepped and draped in a sterile fashion.  Under A/P view with slight cephalad-caudad angulation, the L5-S1 interspace was identified and marked.  Using sterile technique, the superficial skin was anesthetized with 1% Lidocaine.  A 20-gauge Tuohy needle was advanced into the epidural space under fluoroscopy using loss of resistance at the L5-S1 level.  After negative aspiration for heme or CSF, an epidurogram was obtained in the A/P and lateral fluoroscopic views using 2-3 cc of Omnipaque contrast confirming epidural placement of the needle.  After this, 5 cc of a mixture of preservative free normal saline and 80 mg of Kenalog were injected into the epidural space.   Vital signs remained normal throughout the procedure.  The patient tolerated the procedure well.  There were no immediate complications from the performed procedure.  The patient was instructed to apply ice over the injection sites for twenty minutes every two hours for the next 24 hours.  Disposition:      1. The patient was advised to F/U in 1-2 weeks to assess the response to the injection.      2. The patient was also instructed to contact me immediately if there were any concerns related to the procedure performed.

## 2025-03-10 NOTE — IMAGING
[de-identified] : Constitutional: The patient appears well developed, well nourished. Skin: No impressive skin lesions present, except as noted in detailed exam. Lymphatic: No palpable lymphadenopathy in examined body areas. Neurologic: Alert and oriented to time, place and person.   RIGHT KNEE:  Inspection: mild effusion Palpation: medial joint line tenderness, anterior tenderness Knee Range of Motion: 3-125 Strength: 5/5 Quadriceps strength, 5/5 Hamstring strength Neurological: light touch is intact throughout Ligament Stability and Special Tests: McMurrays: neg Lachman: neg Pivot Shift: neg Posterior Drawer: neg Valgus: neg Varus: neg Patella Apprehension: neg Patella Maltracking: neg   LEFT KNEE:  Inspection: mild effusion Palpation: medial joint line tenderness, anterior tenderness Knee Range of Motion: 3-125 Strength: 5/5 Quadriceps strength, 5/5 Hamstring strength Neurological: light touch is intact throughout Ligament Stability and Special Tests: McMurrays: neg Lachman: neg Pivot Shift: neg Posterior Drawer: neg Valgus: neg Varus: neg Patella Apprehension: neg Patella Maltracking: neg    LEFT SHOULDER Inspection: No swelling.  Palpation: Tenderness is noted at the bicipital groove, anterior and lateral.  Range of motion: There is pain with range of motion. , ER 55, @90ER 90, @90IR 30 Strength: There is pain, weakness, and discomfort with strength testing. Forward Flexion 3/5. Abduction 3/5. External Rotation 4/5 and Internal Rotation 5-/5  Neurological testings: motor and sensor intact distally. Ligament Stability and Special Tests:  There is positive arc of pain.  Shoulder apprehension: neg Shoulder relocation: neg Obriens test: pos Biceps Active test: neg Mix Labral Shear: neg Impingement testing: pos Chris testing: pos Whipple: pos Cross Body Adduction: neg   RIGHT WRIST and HAND Inspection: No erythema, No swelling Palpation: TTP over CMC Range of Motion: Full range of motion. Strength: normal Neurological testing: motor exam 5/5 and light touch intact. Special Testing:   Back / Spine: Inspection: No erythema and ecchymosis. Palpation: left lumbar paraspinal tenderness. Tender to palpation over the mid thoracic spine Range of motion: Near full range of motion but with mild stiffness. Pain with lateral rotation. pain at extremes of flexion Strength Testing: Weakness with left ankle dorsiflexion and EHL strength Otherwise 5/5 throughout both lower extremities with normal tone Neurological testing: light touch is intact throughout both lower extremities Straight Leg Raise: positive left / right Babiniski test: neg

## 2025-03-10 NOTE — HISTORY OF PRESENT ILLNESS
[de-identified] : 03/10/2025 : Patient is presenting today for f/u bilateral knees. Euflexxa Injection #  3 today.   3/03/2025: Patient presents today for Euflexxa #2 for bilateral knees. Patient presents with new complaint, reports she fell onto concrete coming out of her home on 2/28/25 experiencing pain in right hand, upper and lower back and left shoulder.   02/24/2025: Pt here today for follow-up for bilateral knee pain. Pain and symptoms are worsening, patient wishes to proceed with Euflexxa series #1 for bilateral knees.  01/22/2025: Pt here today for follow-up right knee. Pain and symptoms are worse. Since last visit, pt said she had her right knee buckle today while doing laundry and has had swelling since. Today she is here to discuss whether she can get a CSI or a knee brace to help her pain.  12/09/2024: Patient here to start Visco-3 series injection #2 left knee and right thumb  12/2/24: Patient here to start Visco-3 series injection #1 left knee and right thumb. ([facility] program)   10/28/24: Pt here today for follow-up for bilateral knee pain and right hand pain. Pain and symptoms are better overall. Since last visit patient received euflexxa gel series, which she had some relief for 3 months. Today c/o pain in the back of the knee and difficulty w/ knee flexion.    8/12/24: Here for Euflexxa inj #3 B/L knees  8/5/24: Here for Euflexxa inj #2 B/L knees  7/29/24: Here for Euflexxa #1 bilateral knees  7/15/24: Here for follow up of B/L knees. Continues to have pain in knees. Currently using a walker for ambulation.

## 2025-03-10 NOTE — PROCEDURE
[FreeTextEntry3] : The risks, benefits, and alternatives to viscosupplementation injection were reviewed with the patient. Risks outlined include but are not limited to infection, sepsis, bleeding, scarring, temporary increase in pain, syncopal episode, failure to resolve symptoms, symptoms recurrence, allergic reaction, flare reaction, pseudoseptic reaction.  Patient understood the risks and asked to proceed with this treatment course.   Large joint injection was performed of the RIGHT and LEFT knee. The indication for this procedure was pain, inflammation and x-ray evidence of Osteoarthritis on this or prior visit. The site was prepped with alcohol, betadine, ethyl chloride sprayed topically and sterile technique used. A 2mL injection of Euflexxa 10mg/mL, series # 3 was used.   Patient tolerated procedure well. Patient was advised to call if redness, pain or fever occur and apply ice for 15 minutes out of every hour for the next 12-24 hours as tolerated.   Patient has tried OTC's including aspirin, Ibuprofen, Aleve, etc or prescription NSAIDS, and/or exercises at home and/or physical therapy without satisfactory response, patient had decreased mobility in the joint and the risks benefits, and alternatives have been discussed, and verbal consent was obtained.   Visualization of the needle and placement of injection was performed without complication.

## 2025-03-21 NOTE — PROCEDURE
[de-identified] : Acupuncture treatment: Baldry technique with multiple needle placement to the lumbar paraspinal and gluteal musculature with UV lamp x 45 minutes Paralumbar chain (bladder meridian) with ES x 45 minutes K3-HT3 SI4-BL60 with ES x 45 minutes BL 10, SP 6, ST 36, LR 3, LI 4, BL 59 Patient tolerated procedure well

## 2025-03-28 NOTE — PROCEDURE
[de-identified] : Acupuncture treatment: Baldry technique with multiple needle placement to the lumbar paraspinal and gluteal musculature with UV lamp x 45 minutes Paralumbar chain (bladder meridian) with ES x 45 minutes K3-HT3 SI4-BL60 with ES x 45 minutes BL 10, SP 6, ST 36, LR 3, LI 4, BL 59 Patient tolerated procedure well

## 2025-04-01 NOTE — PHYSICAL EXAM
[de-identified] : Constitutional:   - No acute distress   - Obese    Neurological:   - normal mood and affect   - alert and oriented x 3       Cardiovascular:   - grossly normal  Lumbar Spine Exam:  Inspection: erythema (-) ecchymosis (-) rashes (-) alignment: no scoliosis  Palpation: paraspinal tenderness:                    L (-); R (-) thoracic paraspinal tenderness:       L (-); R (-) sciatic nerve tenderness :                L (-); R (+) SI joint tenderness:                          L (-); R (-) GTB tenderness:                             L (-); R (-)  ROM: WNL with mild stiffness Pain with extremes of extension and flexion  Strength: Right/Left Hip Flexion: (5/5) (5/5) Quadriceps: (5/5) (5/5) Hamstrings: (5/5) (5/5) Ankle Dorsiflexion: (5/5) (5/5) EHL: (5/5) (5/5) Ankle Plantarflexion: (5/5) (5/5)  Special Tests: SLR:                 R (=); L (-) Facet loading:  R (+); L (+) WILLIAN test:     R (n/a); L (n/a) Tight Hamstrings R (+); L (+)  Neurologic: Light touch intact throughout LE  Reflexes normal and symmetric  Gait: antalgic gait, unsteady  ambulates with assistance of walker  ------------------------------------------------------------------------------ Cervical Spine Exam:   Inspection:   erythema (-)   ecchymosis (-)   rashes (-)    Palpation:                                                    Cervical paraspinal tenderness:         R (+); L (+)  Upper trapezius tenderness:              R (-); L (-)  Rhomboids tenderness:                      R (-); L (-)  Occipital Ridge:                                   R (+); L (+)  Supraspinatus tenderness:                 R (-); L (-)   ROM:   Reduced ROM all planes; crepitus throughout ROM testing pain throughout range of motion testing  Strength Testing:              Deltoid                           R (5/5); L (5/5)  Biceps:                          R (5/5); L (5/5)  Triceps:                         R (5/5); L (5/5)  Finger Abductors:         R (5/5); L (5/5)  Grasp:                           R (5/5); L (5/5)   Special Testing:  Spurling Test:                  R (-); L (-)  Facet load test:               R (+); L (+)   Neuro:  SILT throughout right upper extremity  SILT throughout left upper extremity   Reflexes:  Biceps   -           R (2+); L (2+)  Triceps  -           R (2+); L (2+)  Brachioradialis- R (2+); L (2+)     No ankle clonus

## 2025-04-01 NOTE — ASSESSMENT
[FreeTextEntry1] : A discussion regarding available pain management treatment options occurred with the patient. These included interventional, rehabilitative, pharmacological, and alternative modalities. We will proceed with the following:  Interventional treatment options: - Proceed with bilateral L4-L5, 5-S1 facet joint MBB with fluoroscopic guidance - Would likely proceed with repeat right PM L5-S1 LESI (80 mg Kenalog) with return of severe LE radicular pain - Patient has been counseled on several occasions regarding limitations of corticosteroid administration; we have suggested that she reserve injection therapies for episodes of severe pain exacerbations - she verbalizes understanding of this - Previously discussed realistic expectations of interventional pain therapies in the setting of multilevel central canal stenosis - see additional instructions below  Rehabilitative options: - Completed recent physical therapy trials with modest relief - encouraged participation in active HEP as tolerated  Medication based treatment options: - continue Tylenol 500-1000 mg up to TID on PRN basis - Continue meloxicam 7.5-15 mg daily as needed; counseled patient to discuss consistent NSAID use with her cardiologist - Continue Cymbalta 90 mg daily, prescribed by neurology - Failed previous trials of Lyrica and gabapentin - see additional instructions below  Complementary treatment options: - Weight management and lifestyle modifications discussed - Continue acupuncture therapy as directed - Dr. Valentino Stevenson  Behavioral pain treatment options: - Once again advised pain psychology evaluation; referral provided at prior visit - Patient was counseled regarding alternative coping strategies to deal with her chronic multifocal pain  Additional treatment recommendations as follows: - Orthopedic spine surgical evaluation as planned - Patient was counseled as to red flag signs and when to seek urgent care - Follow-up with neurology as directed regarding chronic headaches - Follow up 1-2 weeks post injection for assessment of efficacy and further treatment recommendations  We have discussed the risks, benefits, and alternatives NSAID therapy including but not limited to the risk of bleeding, thrombosis, gastric mucosal irritation/ulceration, allergic reaction and kidney dysfunction; the patient verbalizes an understanding.  The risks, benefits and alternatives of the proposed procedure were explained in detail with the patient. The risks outlined include but are not limited to infection, bleeding, post- dural puncture headache, nerve injury, a temporary increase in pain, failure to resolve symptoms, need for future interventions, allergic reaction, and possible elevation of blood sugar in diabetics if using corticosteroid.  All questions were answered to patient's apparent satisfaction, and he/she verbalized an understanding.  Patient presents with axial lumbar pain that has not responded to 3 months of conservative therapy including physical therapy or NSAID therapy.  The pain is interfering with activities of daily living and functionality.  There is no radicular pain.  The pain is exacerbated by facet loading.  Positive Kemps maneuver which is defined by pain reproduction with extension and rotation of the lumbar spine to the affected side.  The patient has not had a vertebral fusion at the levels of the proposed treatment.  There is no unexplained neurologic deficit.  There is no history of systemic infection, unstable medical condition, bleeding tendency, or local infection.  The injection is being performed to diagnose the facet joint as the source of the individual's pain, in preparation for a radiofrequency ablation.  I, Kateryna Espinoza, acting as scribe, attest that this documentation has been prepared under the direction and in the presence of Provider Alverto Barney DO.  The documentation recorded by the scribe, in my presence, accurately reflects the service I personally performed, and the decisions made by me with my edits as appropriate.

## 2025-04-01 NOTE — HISTORY OF PRESENT ILLNESS
[Radiating] : radiating [Sharp] : sharp [Constant] : constant [Household chores] : household chores [Leisure] : leisure [Sleep] : sleep [Meds] : meds [Physical therapy] : physical therapy [Sitting] : sitting [Standing] : standing [Walking] : walking [Lower back] : lower back [5] : 5 [Dull/Aching] : dull/aching [Injection therapy] : injection therapy [FreeTextEntry1] : 2025 - Patient presents for FUV after a L5-S1 LESI on 3/7/2025.  Patient reports she can walk more easily after the injection. She continues to have some residual pain in her low back and right hip. She denies any groin pain. Her low back pain is her predominant concern and is exacerbated with any activity. She is still under the care of several subspecialists for various conditions, including neurology for chronic headaches. She tried Botox therapy as previously advised. She is planning to establish care with Dr. Jigar rivera for evaluation of her lumbar spine.  2025 - Patient presents for 8-week FUV. Patient reports multifocal pain once again.  Greatest concern today is in regard to her low back with radiation to the right lower extremity.  Feels that it is still currently manageable with a combination of physical therapy and acupuncture.  She is currently under the care of several subspecialist for various conditions including a neurologist for her chronic headaches.  Botox therapy was suggested, and she is contemplating this.  12/3/2024 - Patient presents for FUV after a L5-S1 LESI on 11/15/2024. Patient reports 65% relief of symptoms and improvement in functionality.  She reports she has not required medication since the procedure and is pleased with relief in her low back pain. She has been receiving acupuncture as well and finds that has provided significant relief to her low back pain.    Today however, her most bothersome area of pain today is her cervical pain and headaches.  She is currently undergoing treatment for vitamin B12 and vitamin D deficiencies.  She is interested in scheduling a repeat DARRELL with the hope of decreasing her pain and increasing her functionality and quality of life.   10/25/2024 - Patient presents for a FUV after a bilateral occipital nerve block with U/S guidance on 2024. Patient reports mild improvement in her headaches at this time however, she has been addressing her cervical spine and finds that has been contributing to her relief.  She was seen by neurology who determined the HAs were generating from her neck and she now has been in physical therapy 3 times/week for the past 14 weeks and will be initiating acupuncture in the next few weeks as well. Of note, patient established care with a new cardiologist and is now on a new medication regimen and reports better controlled hypertension.  Today. her most bothersome symptom is her low back pain with radiation to bilateral lower extremities. She reports burning, numbness and tingling to bilateral feet. Presents today to discuss next steps in her treatment plan for her low back pain with the hope of decreasing her pain and increasing her functionality and quality of life.   2024 - Patient presents for FUV after a bilateral occipital nerve block on 2024. Patient reports a significant reduction in the frequency and intensity of her headaches for about 3-4 days s/p occipital block.  Unfortunately, she experienced a return of symptoms essentially to baseline.  She does continue to have headaches from which she thinks could possibly related to uncontrolled hypertension.  She has been seeing cardiology and will follow up with neurology.  2024 - Patient presents for FUV after a L5-S1 LESI on 2024. Patient reports moderate reduction of her lower back and right leg pain following the epidural.  Unfortunately, patient was seen in the ER 3 days post procedure for what she describes as severe hypertension and ongoing headache.  She was evaluated and discharged home.  Patient has been dealing with headaches for quite some time now without definitive source.  Has seen her longtime neurologist Dr. Kellerman and recently had an evaluation with Dr. Salmon ([facility]) who ordered repeat imaging of the C/T/L spine as well as brain.  Headache severity is greatest at the bilateral occiput with radiation to the top of her head.  2024 - Patient presents for FUV regarding their lower back pain.  She was last seen approximately 6 months ago.  Patient reports sustained relief following her previous lumbar epidural steroid injection which lasted until approximately 1 month ago.  Now has a return of lower back with radiation down the posterior right leg stopping at the posterior calf.  Additionally, she reports a flareup of her peripheral neuropathy as per her treating neurologist.  A course of intravenous corticosteroid was suggested and completed about 5 weeks ago.  Takes Cymbalta 60 mg and Tylenol daily with no benefit; she has discontinued Lyrica due to no perceived benefit.  Of note she is undergoing further neurological workup and will be undergoing a spinal tap this week.  2023 - Patient presents for FUV after a L5-S1 LESI on 2023. Patient reports a complete resolution of pain s/p injection, she is pleased with the results of the epidural; she continues to take 100 mg of Lyrica BID.   10/26/2023 - Patient presents for FUV regarding their lower back pain.   Patient reports radiation to the right lower extremity extending to the foot.  Currently has titrated to a dose of Lyrica 200 mg daily which has reduced some of the "neuropathy sensations". She also complains of additional multifocal joint pain; greatest of which is in her right knee.  She sees Dr. Nguyen for this.  2023- Patient presents for FUV regarding their lower back pain. Patient had started taking Tramadol and felt sick with nausea and sweating days after using, she has since discontinued use; she no longer gets IV steroid infusions as well.  Still with complaints in the low back with radiation to the right lower extremity extending to the ankle/foot.  Worst pain is concentrated in the right lateral aspect of the calf. Greatest degree of difficulty with ambulation.  2023 - Patient presents for FUV regarding their lower back pain.  Patient did not start the G[place]  financial constraints.  She is following with a neurologist for IV steroid infusions for tx of peripheral neuropathy.  Patient reports she gets right sided arm numbness/tingling and right foot paranesthesia's.  She is becoming frustrated by her multitude of pain issues.  She is dealing with concurrent neck, low back, knee, and neurological issues.  2023 - Patient presents to review lumbar spine MRI from 2023. The majority of the patient's pain is in the right foot, and occasionally the first digit on the left foot, there is no pain in the rest of the lower extremities, she has back pain, but it is not as prominent as the right foot pain.  Has seen neurology in the past and obtained EMG.  Planning for follow-up in upcoming week.  2023 - Patient presents for FUV after a L5-S1 LESI on 2023.  Patient reports overall 50% relief sustained following the injection.  Patient reports her pain returned to baseline after about 2 weeks. She complains of primarily axial lower back pain specifically with prolonged standing.  2023 - Patient presents for FUV after a right PM C7-T1 DARRELL on 2023. Patient reports significant pain reduction following the injection (60% reduction in pain).  She is pleased with her response.  Patient's main complaint today is her axial lower back pain that has radiation to the medial right thigh, she had taken a trip to [place] recently for a week where she did extensive walking and exacerbated her back pain; she can endure 5-10 minutes of standing before it becomes unbearable.  Has had previous neurosurgical evaluations and surgery was recommended however she defers.  2023 - Patient presents for reevaluation.  She was last seen approximately 6 months ago.  Patient to the office regarding her neck pain. Patient was referred by Dr. Arevalo. Patient was originally seen for her back in September. Patient was placed on MDP for her right upper extremity radicular pain with good relief. Patient complains of right rotation stiffness in regards to her neck. Patient here for possible interventional management of cervical region ridiculous.  Taking dual action Advil once sometimes twice daily.  2022 - The patient presents for initial evaluation regarding their low back pain. Patient was referred by Dr. Nguyen. Patient sustained a slip and fall in 2021 and landed on her buttocks. Significant flare-up in her lower back since fall.  Experiences numbness in her toes and fingertips get tingling sensation when she closes her fists. Patient has gotten trigger point injections with no relief. Leg symptoms improve when seated. Diclofenac provides relief, previously tried Celebrex and Gabapentin - no relief.   Injections: 1) C7-T1 DARRELL (2023) 2) L5-S1 LESI (prior DOS, 2024, 11/15/2024, 3/7/2025) 3) Bilateral occipital nerve block with U/S guidance - (2024, 2024)   Pertinent Surgical History: N/A   Imagin) MRI Lumbar Spine (2024) - [facility]  2) MRI Thoracic Spine (2024) - [facility]  3) MRI Cervical Spine (2024)- [facility]  Electrodiagnostic Testin) Lower Extremity NCV/EMG - (2022) - [facility]  1. 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 left peroneal motor and the right peroneal motor nerves showed reduced amplitude and decreased conduction velocity. The left tibial motor nerve showed decreased conduction velocity. The right tibial motor nerve showed reduced amplitude. The left sural sensory and right sural sensory nerves were unremarkable.  3) Upper Extremity NCV/EMG - (22) - [facility]  1. very mild bilateral median nerve lesions at the wrists 2. very mild bilateral ulnar nerve lesion at the elbows.   Physician Disclaimer: I have personally reviewed and confirmed all HPI data with the patient.  [] : Post Surgical Visit: no [de-identified] : C/L MRI AT

## 2025-04-07 NOTE — DISCUSSION/SUMMARY
[de-identified] : 74 Y F w/ cervical & thoracic spondylosis & lumbar spondylolisthesis. She is not interested in surgical intervention at this time, ultimate solution would be a L2-5 laminectomy and L3-5 PSF W/ interbody fusion.   Patient was educated and informed on their condition along with the expected outcomes. Advanced imaging from  reviewed & discussed with patient today. Patient was provided with a referral for lumbar physical therapy to work on stretching, strengthening and range of motion. Patient was provided with a lumbar home exercise program. Continue to follow up w/ Dr. Stevenson & Ector, proceed w ablation.    Moving forward I'd like to see as needed.   Prior to appointment and during encounter with patient extensive medical records were reviewed including but not limited to, hospital records, out patient records, imaging results, and lab data. During this appointment the patient was examined, diagnoses were discussed and explained in a face to face manner. In addition extensive time was spent reviewing aforementioned diagnostic studies. Counseling including abnormal image results, differential diagnoses, treatment options, risk and benefits, lifestyle changes, current condition, and current medications was performed. Patient's comments, questions, and concerns were address and patient verbalized understanding. Based on this patient's presentation at our office, which is an orthopedic spine surgeon's office, this patient inherently / intrinsically has a risk, however minute, of developing issues such as Cauda equina syndrome, bowel and bladder changes, or progression of motor or neurological deficits such as paralysis which may be permanent.    I, Rebeca Hoffman, attest that this documentation has been prepared under the direction and in the presence of provider Patel Kimball MD.

## 2025-04-07 NOTE — PROCEDURE
[Medium Joint Injection] : medium joint injection [Right] : of the right [CMC Joint] : CMC joint [Pain] : pain [Inflammation] : inflammation [X-ray evidence of Osteoarthritis on this or prior visit] : x-ray evidence of Osteoarthritis on this or prior visit [Alcohol] : alcohol [Ethyl Chloride sprayed topically] : ethyl chloride sprayed topically [Sterile technique used] : sterile technique used [___ cc    6mg] :  Betamethasone (Celestone) ~Vcc of 6mg [___ cc    1%] : Lidocaine ~Vcc of 1%  [___ cc    0.25%] : Bupivacaine (Marcaine) ~Vcc of 0.25%  [] : Patient tolerated procedure well [FreeTextEntry3] : Patient Identification Name/: Verbal with patient and/or family   Procedure Verification: Procedure confirmed with patient or family/designee Consent for procedure: Verbal Consent Given Relevant documentation completed, reviewed, and signed Clinical indications for procedure confirmed   Time-out with all members of procedure team immediately prior to procedure: Correct patient identified. Agreement on procedure. Correct side and site.   ULTRASOUND GUIDED SHOULDER SUBACROMIAL INJECTION - RIGHT After verbal consent and identification of the correct patient and correct site, the posterior RIGHT shoulder were prepped using alcohol. This was allowed time to air dry. After ethyl chloride spray for skin anesthesia, a mixture of 1cc Celestone 6mg/ml, 3cc Lidocaine 1%, and 3cc Bupivacaine 0.5% was injected under ultrasound guidance into the subacromial space of both shoulders from posterior using a sterile 22G needle. Visualization of the needle and placement of each injection was performed without any complications. Ultrasound was used for visualization, precise injection in area of tear, and / or prior failure or difficult injection. The patient tolerated the procedure well. After-care instructions were provided and included instructions to ice the area and to call if redness, pain, or fever develop.

## 2025-04-07 NOTE — HISTORY OF PRESENT ILLNESS
[Mid-back] : mid-back [Lower back] : lower back [7] : 7 [Dull/Aching] : dull/aching [Sharp] : sharp [Constant] : constant [Household chores] : household chores [Leisure] : leisure [Nothing helps with pain getting better] : Nothing helps with pain getting better [Walking] : walking [Retired] : Work status: retired [de-identified] : 04/02/2025:  74 Y F presenting today for an initial evaluation. 30-year HX of back pains. Constant R hip and glute pains. Difficulty walking for more than 5-10 minutes before symptomatic of back and LE pains. Seen by Dr. Hills 2018, indicated for surgery but refused at that time due to recovery time frame. Has been ambulating with rollator past 1.5 years.  Has not been driving due to IVIG, peripheral neuropathy affecting arms and legs, legs are worse condition, onset 2.5 years prior. Treated 3 weeks prior with lumbar ESIs w/ Dr. Barney, mild relief, indicated for an ablation. Frequent falls.  Chronic headaches since she was 17 years old, posterior neck pains. She has been in PT for cervical spine since June 2024. Has not treated w/ lumbar PT as of recent.  [] : no [FreeTextEntry5] : 1mos ago pt had fall outside her house, face down on concrete.  Pt has HX of frequent falls.  Patient was seen by Dr. Nguyen and Pain Managment.  X-rays was taken and referred to Dr. Jigar Najera 3weeks ago -Dr. Eller [FreeTextEntry7] : Rt hip [de-identified] : Dr. Nguyen [de-identified] : xray [facility] MRI lumbar/thoracic @ [facility]

## 2025-04-07 NOTE — DATA REVIEWED
[Bone Density] : bone density [FreeTextEntry1] : On my interpretation of these images from [facility] JUNE 2024. I have additionally reviewed the radiologist report. MRI-  C2-3:  C3-4: C4-5: C5-6: C6-7: C7-T1:    I have independently reviewed and interpreted these images and reports. [facility] thoracic MRI on 3/3/25. Multilevel mod DDD w/ HNP resulting in mild-mod degrees of FS, no spinal cord compression. Chronic compression deformity of T3 from prior MRIs.   On my interpretation of these images from [facility] 3/3/25 I have additionally reviewed the radiologist report. MRI-  L5-S1:  mod DDD, mod facet degeneration, mild central and mod FS BL.  L4-5: adv facet degeneration, grade 2 listhesis, severe lateral recess and severe RT FS.  L3-4: grade 1 listhesis adv facet degeneration, mod-severe lateral recess FS R>L.  L2-3: mod DDD, central protrusion, mod BL lateral recess stenosis. L1-2: mod DDD, central HNP, mild-mod stenosis.  T12-L1: mod DDD, broad based herniation BL, mod FS.  In comparison to June 2024 MRI, no progression.    On my interpretations of these images from [facility] in [place] on 3/3/25.  have independently reviewed and interpreted these images.  2 V lumbar XRs multilevel adv facet arthropathy and grade 1 listhesis. Grade 2 listhesis L4 on L5.

## 2025-04-07 NOTE — IMAGING
[de-identified] : RIGHT SHOULDER Inspection: No swelling.  Palpation: Tenderness is noted at the bicipital groove, anterior and lateral.  Range of motion: There is pain with range of motion. , ER 55, @90ER 90, @90IR 30 Strength: There is pain, weakness, and discomfort with strength testing. Forward Flexion 3/5. Abduction 3/5. External Rotation 4/5 and Internal Rotation 5-/5  Neurological testings: motor and sensor intact distally. Ligament Stability and Special Tests:  There is positive arc of pain.  Shoulder apprehension: neg Shoulder relocation: neg Obriens test: pos Biceps Active test: neg Mix Labral Shear: neg Impingement testing: pos Chris testing: pos Whipple: pos Cross Body Adduction: neg  RIGHT WRIST and HAND Inspection: No erythema, No swelling  Palpation: Tenderness over CMC and base of thumb Range of Motion: Full range of motion. Strength: normal Neurological testing: motor exam 5/5 and light touch intact.  Special Testing:

## 2025-04-07 NOTE — HISTORY OF PRESENT ILLNESS
[de-identified] : 04/07/2025: The patient is a 74 year old F, right hand dominant who presents today complaining of right shoulder and right thumb pain. Date of Injury/Onset: 2022 Mechanism of injury: gradual onset Quality of symptoms: general shoulder pain, rt hand/wrist swelling Improves with: None  Worse with: OH movement, reaching behind, brushing her hair  Prior treatment: Dr. Ovalle 2022 - XR rt shoulder Prior imaging: XR [facility] Out of work/sport: N/A School/Sport/Position/Occupation: retired Additional Information: None

## 2025-04-07 NOTE — PHYSICAL EXAM
[Flexion] : flexion [Extension] : extension [Rotation to left] : rotation to left [Rotation to right] : rotation to right [4___] : left grasp 4[unfilled]/5 [de-identified] : Constitutional: - General Appearance: Unremarkable Body Habitus Well Developed Well Nourished Body Habitus No Deformities Well Groomed Ability To communicate: Normal Neurologic: Global sensation is intact to upper and lower extremities. See examination of Neck and/or Spine for exceptions. Orientation to Time, Place and Person is: Normal Mood And Affect is Normal Skin: - Head/Face, Right Upper/Lower Extremity, Left Upper/Lower Extremity: Normal See Examination of Neck and/or Spine for exceptions Cardiovascular: Peripheral Cardiovascular System is Normal Palpation of Lymph Nodes: Normal Palpation of lymph nodes in: Axilla, Cervical, Inguinal Abnormal Palpation of lymph nodes in: None  [de-identified] : left lateral rotation 75 degrees [TWNoteComboBox6] : right lateral rotation 60 degrees [] : clonus not sustained at ankle [FreeTextEntry8] : R>L sciatic notch.  [FreeTextEntry9] : not tested.  [de-identified] : eversion BL 4/5 RT.  [de-identified] : + SLR BL in thighs.

## 2025-04-07 NOTE — DISCUSSION/SUMMARY
[de-identified] : 74 Y F w/ cervical & thoracic spondylosis & lumbar spondylolisthesis. She is not interested in surgical intervention at this time, ultimate solution would be a L2-5 laminectomy and L3-5 PSF W/ interbody fusion.   Patient was educated and informed on their condition along with the expected outcomes. Advanced imaging from  reviewed & discussed with patient today. Patient was provided with a referral for lumbar physical therapy to work on stretching, strengthening and range of motion. Patient was provided with a lumbar home exercise program. Continue to follow up w/ Dr. Stevenson & Ector, proceed w ablation.    Moving forward I'd like to see as needed.   Prior to appointment and during encounter with patient extensive medical records were reviewed including but not limited to, hospital records, out patient records, imaging results, and lab data. During this appointment the patient was examined, diagnoses were discussed and explained in a face to face manner. In addition extensive time was spent reviewing aforementioned diagnostic studies. Counseling including abnormal image results, differential diagnoses, treatment options, risk and benefits, lifestyle changes, current condition, and current medications was performed. Patient's comments, questions, and concerns were address and patient verbalized understanding. Based on this patient's presentation at our office, which is an orthopedic spine surgeon's office, this patient inherently / intrinsically has a risk, however minute, of developing issues such as Cauda equina syndrome, bowel and bladder changes, or progression of motor or neurological deficits such as paralysis which may be permanent.    I, Rebeca Hoffman, attest that this documentation has been prepared under the direction and in the presence of provider Patel Kimball MD.

## 2025-04-07 NOTE — DISCUSSION/SUMMARY
[de-identified] : Right X-Ray Examination of the SHOULDER (2 views): No fractures, subluxations or dislocations.  Right X-Ray Examination of the SCAPULA 1 or 2 views shows: No significant abnormalities. Acromial spur.  Assessment:   The patient is a 74 year old female with physical exam findings consistent with RIGHT SHOULDER IMPINGEMENT, LIKELY CUFF TEAR ARTHROPATHY AND CMC JOINT ARTHRITIS.    - We discussed their diagnosis and treatment options at length including the risks and benefits of both surgical and non-surgical options. - We will continue conservative treatment with a course of PT, icing, and anti-inflammatory medication. - The patient was provided with a prescription to work on scapular strengthening and rotator cuff strengthening. - The patient was advised to let pain guide the gradual advancement of activities. - We also discussed the possible of a corticosteroid injection in order to help decrease inflammation and pain so that they can perform better therapy. - The risks, benefits, and alternatives to corticosteroid injection were reviewed with the patient and they wished to proceed with this treatment course.   Follow up as needed in 6 weeks to re-evaluate progress with therapy

## 2025-04-07 NOTE — PHYSICAL EXAM
[Flexion] : flexion [Extension] : extension [Rotation to left] : rotation to left [Rotation to right] : rotation to right [4___] : left grasp 4[unfilled]/5 [de-identified] : Constitutional: - General Appearance: Unremarkable Body Habitus Well Developed Well Nourished Body Habitus No Deformities Well Groomed Ability To communicate: Normal Neurologic: Global sensation is intact to upper and lower extremities. See examination of Neck and/or Spine for exceptions. Orientation to Time, Place and Person is: Normal Mood And Affect is Normal Skin: - Head/Face, Right Upper/Lower Extremity, Left Upper/Lower Extremity: Normal See Examination of Neck and/or Spine for exceptions Cardiovascular: Peripheral Cardiovascular System is Normal Palpation of Lymph Nodes: Normal Palpation of lymph nodes in: Axilla, Cervical, Inguinal Abnormal Palpation of lymph nodes in: None  [de-identified] : left lateral rotation 75 degrees [TWNoteComboBox6] : right lateral rotation 60 degrees [] : clonus not sustained at ankle [FreeTextEntry8] : R>L sciatic notch.  [FreeTextEntry9] : not tested.  [de-identified] : eversion BL 4/5 RT.  [de-identified] : + SLR BL in thighs.

## 2025-04-07 NOTE — HISTORY OF PRESENT ILLNESS
[Mid-back] : mid-back [Lower back] : lower back [7] : 7 [Dull/Aching] : dull/aching [Sharp] : sharp [Constant] : constant [Household chores] : household chores [Leisure] : leisure [Nothing helps with pain getting better] : Nothing helps with pain getting better [Walking] : walking [Retired] : Work status: retired [de-identified] : 04/02/2025:  74 Y F presenting today for an initial evaluation. 30-year HX of back pains. Constant R hip and glute pains. Difficulty walking for more than 5-10 minutes before symptomatic of back and LE pains. Seen by Dr. Hills 2018, indicated for surgery but refused at that time due to recovery time frame. Has been ambulating with rollator past 1.5 years.  Has not been driving due to IVIG, peripheral neuropathy affecting arms and legs, legs are worse condition, onset 2.5 years prior. Treated 3 weeks prior with lumbar ESIs w/ Dr. Barney, mild relief, indicated for an ablation. Frequent falls.  Chronic headaches since she was 17 years old, posterior neck pains. She has been in PT for cervical spine since June 2024. Has not treated w/ lumbar PT as of recent.  [] : no [FreeTextEntry5] : 1mos ago pt had fall outside her house, face down on concrete.  Pt has HX of frequent falls.  Patient was seen by Dr. Nguyen and Pain Managment.  X-rays was taken and referred to Dr. Jigar Najera 3weeks ago -Dr. Eller [FreeTextEntry7] : Rt hip [de-identified] : Dr. Nguyen [de-identified] : xray [facility] MRI lumbar/thoracic @ [facility]

## 2025-04-07 NOTE — DATA REVIEWED
[Bone Density] : bone density [FreeTextEntry1] : On my interpretation of these images from [facility] JUNE 2024. I have additionally reviewed the radiologist report. MRI-  C2-3:  C3-4: C4-5: C5-6: C6-7: C7-T1:    I have independently reviewed and interpreted these images and reports. [facility] thoracic MRI on 3/3/25. Multilevel mod DDD w/ HNP resulting in mild-mod degrees of FS, no spinal cord compression. Chronic compression deformity of T3 from prior MRIs.   On my interpretation of these images from [facility] 3/3/25 I have additionally reviewed the radiologist report. MRI-  L5-S1:  mod DDD, mod facet degeneration, mild central and mod FS BL.  L4-5: adv facet degeneration, grade 2 listhesis, severe lateral recess and severe RT FS.  L3-4: grade 1 listhesis adv facet degeneration, mod-severe lateral recess FS R>L.  L2-3: mod DDD, central protrusion, mod BL lateral recess stenosis. L1-2: mod DDD, central HNP, mild-mod stenosis.  T12-L1: mod DDD, broad based herniation BL, mod FS.  In comparison to June 2024 MRI, no progression.    On my interpretations of these images from [facility] in [place] on 3/3/25.  have independently reviewed and interpreted these images.  2 V lumbar XRs multilevel adv facet arthropathy and grade 1 listhesis. Grade 2 listhesis L4 on L5.

## 2025-04-11 NOTE — PROCEDURE
[de-identified] : Acupuncture treatment: Baldry technique with multiple needle placement to the lumbar paraspinal and gluteal musculature with UV lamp x 45 minutes Paralumbar chain (bladder meridian) with ES x 45 minutes K3-HT3 SI4-BL60 with ES x 45 minutes BL 10, SP 6, ST 36, LR 3, LI 4, BL 59 Patient tolerated procedure well

## 2025-04-14 NOTE — CONSULT LETTER
[Dear  ___] : Dear  [unfilled], [Consult Letter:] : I had the pleasure of evaluating your patient, [unfilled]. [Please see my note below.] : Please see my note below. [Consult Closing:] : Thank you very much for allowing me to participate in the care of this patient.  If you have any questions, please do not hesitate to contact me. [Sincerely,] : Sincerely, [FreeTextEntry3] :  Aryan Hollis MD FACS

## 2025-04-14 NOTE — PHYSICAL EXAM
[FreeTextEntry8] : cerumen removed via curettage [FreeTextEntry9] : cerumen removed via curettage  [FreeTextEntry5] : no response to tuning fork [de-identified] : mildly inflamed turbinates. deviated septum  [Midline] : trachea located in midline position [de-identified] : mouth is a little dry [Normal] : no rashes

## 2025-04-14 NOTE — PROCEDURE
[Complicated Symptoms] : complicated symptoms requiring more thorough examination than provided by mirror [None] : none [Flexible Endoscope] : examined with the flexible endoscope [de-identified] : Flexible Laryngoscopy: -no pooling -edema of the postcricoid, arytenoids and interarytenoids  -normal cord motion

## 2025-04-14 NOTE — HISTORY OF PRESENT ILLNESS
[de-identified] : Patient presents stating that her neurologist Dr. Get Balderas recommended her here. She states she had Zenker surgery done in 2018 in [place]. She states last year she had a swallow test done at [facility]. States her difficulty swallowing has gotten worse since then. Patient would like to have another swallow test done. She states food gets stuck frequently. She states if she drinks liquids after eating, she can tolerate food better. Denies indigestion or undigested food coming up. Denies feeling neck bulging after eating. States she gets horrible migraines. States she doesn't have difficulty breathing when eating, states the last time that happened was before the surgery in 2018.

## 2025-04-14 NOTE — REVIEW OF SYSTEMS
[Ear Itch] : ear itch [Dizziness] : dizziness [Vertigo] : vertigo [Throat Dryness] : throat dryness [Palpitations] : palpitations [Shortness Of Breath] : shortness of breath [Heartburn] : heartburn [Anxiety] : anxiety [Negative] : Heme/Lymph [de-identified] : swelling neck  [FreeTextEntry7] : reflux, difficulty swallowing  [FreeTextEntry9] : muscle ache  [FreeTextEntry1] : fatigue, weight loss

## 2025-04-14 NOTE — ADDENDUM
[FreeTextEntry1] :  Documented by Celestino Savage acting as scribe for Dr. Hollis on 04/14/2025. All Medical record entries made by the Scribe were at my, Dr. Hollis, direction and personally dictated by me on 04/14/2025 . I have reviewed the chart and agree that the record accurately reflects my personal performance of the history, physical exam, assessment and plan. I have also personally directed, reviewed, and agreed with the discharge instructions.

## 2025-04-17 NOTE — PROCEDURE
[de-identified] : Acupuncture treatment: Baldry technique with multiple needle placement to the lumbar paraspinal and gluteal musculature with UV lamp x 45 minutes Paralumbar chain (bladder meridian) with ES x 45 minutes K3-HT3 SI4-BL60 with ES x 45 minutes BL 10, SP 6, ST 36, LR 3, LI 4, BL 59 Patient tolerated procedure well
97.7

## 2025-04-25 NOTE — PROCEDURE
[de-identified] : Acupuncture treatment: Baldry technique with multiple needle placement to the cervical paraspinal, lumbar paraspinal and gluteal musculature with UV lamp x 45 minutes Paralumbar chain (bladder meridian) with ES x 45 minutes K3-HT3 SI4-BL60 with ES x 45 minutes BL 10, SP 6, ST 36, LR 3, LI 4, BL 59, omega 2, Cooper men Patient tolerated procedure well

## 2025-04-29 NOTE — PHYSICAL EXAM
[de-identified] : Constitutional:   - No acute distress   - Obese    Neurological:   - normal mood and affect   - alert and oriented x 3       Cardiovascular:   - grossly normal  Lumbar Spine Exam:  Inspection: erythema (-) ecchymosis (-) rashes (-) alignment: no scoliosis  Palpation: paraspinal tenderness:                    L (-); R (-) thoracic paraspinal tenderness:       L (-); R (-) sciatic nerve tenderness :                L (-); R (+) SI joint tenderness:                          L (-); R (-) GTB tenderness:                             L (-); R (-)  ROM: WNL with mild stiffness Pain with extremes of extension and flexion  Strength: Right/Left Hip Flexion: (5/5) (5/5) Quadriceps: (5/5) (5/5) Hamstrings: (5/5) (5/5) Ankle Dorsiflexion: (5/5) (5/5) EHL: (5/5) (5/5) Ankle Plantarflexion: (5/5) (5/5)  Special Tests: SLR:                 R (=); L (-) Facet loading:  R (+); L (+) WILLIAN test:     R (n/a); L (n/a) Tight Hamstrings R (+); L (+)  Neurologic: Light touch intact throughout LE  Reflexes normal and symmetric  Gait: antalgic gait, unsteady  ambulates with assistance of walker  ------------------------------------------------------------------------------ Cervical Spine Exam:   Inspection:   erythema (-)   ecchymosis (-)   rashes (-)    Palpation:                                                    Cervical paraspinal tenderness:         R (+); L (+)  Upper trapezius tenderness:              R (-); L (-)  Rhomboids tenderness:                      R (-); L (-)  Occipital Ridge:                                   R (-); L (-)  Supraspinatus tenderness:                 R (-); L (-)   ROM:   Reduced ROM all planes; crepitus throughout ROM testing pain throughout range of motion testing  Strength Testing:              Deltoid                           R (5/5); L (5/5)  Biceps:                          R (5/5); L (5/5)  Triceps:                         R (5/5); L (5/5)  Finger Abductors:         R (5/5); L (5/5)  Grasp:                           R (5/5); L (5/5)   Special Testing:  Spurling Test:                  R (=); L (=)  Facet load test:               R (+); L (+)   Neuro:  SILT throughout right upper extremity  SILT throughout left upper extremity   Reflexes:  Biceps   -           R (2+); L (2+)  Triceps  -           R (2+); L (2+)  Brachioradialis- R (2+); L (2+)     No ankle clonus

## 2025-04-29 NOTE — DISCUSSION/SUMMARY
[de-identified] : Bilateral X-Ray Examination of the KNEE (4 views): there are no fractures, subluxations or dislocations. Medial and Patellofemoral degenerative changes.  - Patient states she had a recent fall stepping outside of her home on 4/25.   BILATERAL KNEES R>L: - The natural progression of osteoarthritis was explained to the patient. Dx is OA in bilateral knees. We discussed the possibility of treatment options from conservative to operative. These included NSAIDS, Glucosamine and Chondrotin sulfate, as well different types of injections.  We also discussed that at some point they may progress to needed a TKA, not needed at this time. Information and pamphlets were given. - Discussed Playmaker brace to improve stability, dispensed in office. - Discussed CSI today. The risks, benefits, and alternatives to corticosteroid injections were reviewed with the patient. Risks outlined include but are not limited to infection, sepsis, bleeding, scarring, skin discoloration, temporary increase in pain, syncopal episode, failure to resolve symptoms, symptoms recurrence, allergic reaction, flare reaction, and elevation of blood sugar in diabetics. Patient understood the risks and asked to proceed with this treatment course. Jovan well in RIGHT KNEE.  - Discussed future gel injections.   Follow Up: 3 months or sooner as needed   Dx / Natural History: The patient was advised of the diagnosis.  The natural history of the pathology was explained in full to the patient in layman's terms.  Several different treatment options were discussed and explained in full to the patient including the risks and benefits of both surgical and non-surgical treatments.  All questions and concerns were answered.   Pain Guide Activities: The patient was advised to let pain guide the gradual advancement of activities.  Icing: The patient was advised to apply ice (wrapped in a towel or protective covering) to the area daily (20 minutes at a time, 2-4X/day).

## 2025-04-29 NOTE — HISTORY OF PRESENT ILLNESS
[Lower back] : lower back [Radiating] : radiating [Sharp] : sharp [Constant] : constant [Household chores] : household chores [Leisure] : leisure [Sleep] : sleep [Meds] : meds [Physical therapy] : physical therapy [Injection therapy] : injection therapy [Sitting] : sitting [Standing] : standing [Walking] : walking [Neck] : neck [9] : 9 [Stabbing] : stabbing [FreeTextEntry1] : 2025 - Patient presents for 4-week FUV.  Patient reports she continues to have low back pain. However, she reports her neck pain is now her predominant concern. She has pain in her neck traveling up to the back of her head and bilateral traps, associated with headaches. She has tried Botox with an outside neurologist, without benefit. She continues PT and acupuncture. Patient reports she followed up with Dr. Kimball who indicated she is a surgical candidate; however, she defers surgical intervention at this time. Of note, patient is following up with Dr. Nguyen for right knee pain; she reports she fell a few days ago onto her knee. She is taking Meloxicam PRN, Cymbalta daily, and Aspirin 81 mg prophylactically.  2025 - Patient presents for FUV after a L5-S1 LESI on 3/7/2025.  Patient reports she can walk more easily after the injection. She continues to have some residual pain in her low back and right hip. She denies any groin pain. Her low back pain is her predominant concern and is exacerbated with any activity. She is still under the care of several subspecialists for various conditions, including neurology for chronic headaches. She tried Botox therapy as previously advised. She is planning to establish care with Dr. Kimball soon for evaluation of her lumbar spine.  2025 - Patient presents for 8-week FUV. Patient reports multifocal pain once again.  Greatest concern today is in regard to her low back with radiation to the right lower extremity.  Feels that it is still currently manageable with a combination of physical therapy and acupuncture.  She is currently under the care of several subspecialist for various conditions including a neurologist for her chronic headaches.  Botox therapy was suggested, and she is contemplating this.  12/3/2024 - Patient presents for FUV after a L5-S1 LESI on 11/15/2024. Patient reports 65% relief of symptoms and improvement in functionality.  She reports she has not required medication since the procedure and is pleased with relief in her low back pain. She has been receiving acupuncture as well and finds that has provided significant relief to her low back pain.    Today however, her most bothersome area of pain today is her cervical pain and headaches.  She is currently undergoing treatment for vitamin B12 and vitamin D deficiencies.  She is interested in scheduling a repeat DARRELL with the hope of decreasing her pain and increasing her functionality and quality of life.   10/25/2024 - Patient presents for a FUV after a bilateral occipital nerve block with U/S guidance on 2024. Patient reports mild improvement in her headaches at this time however, she has been addressing her cervical spine and finds that has been contributing to her relief.  She was seen by neurology who determined the HAs were generating from her neck and she now has been in physical therapy 3 times/week for the past 14 weeks and will be initiating acupuncture in the next few weeks as well. Of note, patient established care with a new cardiologist and is now on a new medication regimen and reports better controlled hypertension.  Today. her most bothersome symptom is her low back pain with radiation to bilateral lower extremities. She reports burning, numbness and tingling to bilateral feet. Presents today to discuss next steps in her treatment plan for her low back pain with the hope of decreasing her pain and increasing her functionality and quality of life.   2024 - Patient presents for FUV after a bilateral occipital nerve block on 2024. Patient reports a significant reduction in the frequency and intensity of her headaches for about 3-4 days s/p occipital block.  Unfortunately, she experienced a return of symptoms essentially to baseline.  She does continue to have headaches from which she thinks could possibly related to uncontrolled hypertension.  She has been seeing cardiology and will follow up with neurology.  2024 - Patient presents for FUV after a L5-S1 LESI on 2024. Patient reports moderate reduction of her lower back and right leg pain following the epidural.  Unfortunately, patient was seen in the ER 3 days post procedure for what she describes as severe hypertension and ongoing headache.  She was evaluated and discharged home.  Patient has been dealing with headaches for quite some time now without definitive source.  Has seen her longtime neurologist Dr. Kellerman and recently had an evaluation with Dr. Salmon ([facility]) who ordered repeat imaging of the C/T/L spine as well as brain.  Headache severity is greatest at the bilateral occiput with radiation to the top of her head.  2024 - Patient presents for FUV regarding their lower back pain.  She was last seen approximately 6 months ago.  Patient reports sustained relief following her previous lumbar epidural steroid injection which lasted until approximately 1 month ago.  Now has a return of lower back with radiation down the posterior right leg stopping at the posterior calf.  Additionally, she reports a flareup of her peripheral neuropathy as per her treating neurologist.  A course of intravenous corticosteroid was suggested and completed about 5 weeks ago.  Takes Cymbalta 60 mg and Tylenol daily with no benefit; she has discontinued Lyrica due to no perceived benefit.  Of note she is undergoing further neurological workup and will be undergoing a spinal tap this week.  2023 - Patient presents for FUV after a L5-S1 LESI on 2023. Patient reports a complete resolution of pain s/p injection, she is pleased with the results of the epidural; she continues to take 100 mg of Lyrica BID.   10/26/2023 - Patient presents for FUV regarding their lower back pain.   Patient reports radiation to the right lower extremity extending to the foot.  Currently has titrated to a dose of Lyrica 200 mg daily which has reduced some of the "neuropathy sensations". She also complains of additional multifocal joint pain; greatest of which is in her right knee.  She sees Dr. Nguyen for this.  2023- Patient presents for FUV regarding their lower back pain. Patient had started taking Tramadol and felt sick with nausea and sweating days after using, she has since discontinued use; she no longer gets IV steroid infusions as well.  Still with complaints in the low back with radiation to the right lower extremity extending to the ankle/foot.  Worst pain is concentrated in the right lateral aspect of the calf. Greatest degree of difficulty with ambulation.  2023 - Patient presents for FUV regarding their lower back pain.  Patient did not start the G[place]  financial constraints.  She is following with a neurologist for IV steroid infusions for tx of peripheral neuropathy.  Patient reports she gets right sided arm numbness/tingling and right foot paranesthesia's.  She is becoming frustrated by her multitude of pain issues.  She is dealing with concurrent neck, low back, knee, and neurological issues.  2023 - Patient presents to review lumbar spine MRI from 2023. The majority of the patient's pain is in the right foot, and occasionally the first digit on the left foot, there is no pain in the rest of the lower extremities, she has back pain, but it is not as prominent as the right foot pain.  Has seen neurology in the past and obtained EMG.  Planning for follow-up in upcoming week.  2023 - Patient presents for FUV after a L5-S1 LESI on 2023.  Patient reports overall 50% relief sustained following the injection.  Patient reports her pain returned to baseline after about 2 weeks. She complains of primarily axial lower back pain specifically with prolonged standing.  2023 - Patient presents for FUV after a right PM C7-T1 DARRELL on 2023. Patient reports significant pain reduction following the injection (60% reduction in pain).  She is pleased with her response.  Patient's main complaint today is her axial lower back pain that has radiation to the medial right thigh, she had taken a trip to [place] recently for a week where she did extensive walking and exacerbated her back pain; she can endure 5-10 minutes of standing before it becomes unbearable.  Has had previous neurosurgical evaluations and surgery was recommended however she defers.  2023 - Patient presents for reevaluation.  She was last seen approximately 6 months ago.  Patient to the office regarding her neck pain. Patient was referred by Dr. Arevalo. Patient was originally seen for her back in September. Patient was placed on MDP for her right upper extremity radicular pain with good relief. Patient complains of right rotation stiffness in regards to her neck. Patient here for possible interventional management of cervical region ridiculous.  Taking dual action Advil once sometimes twice daily.  2022 - The patient presents for initial evaluation regarding their low back pain. Patient was referred by Dr. Nguyen. Patient sustained a slip and fall in 2021 and landed on her buttocks. Significant flare-up in her lower back since fall.  Experiences numbness in her toes and fingertips get tingling sensation when she closes her fists. Patient has gotten trigger point injections with no relief. Leg symptoms improve when seated. Diclofenac provides relief, previously tried Celebrex and Gabapentin - no relief.   Injections: 1) C7-T1 DARRELL (2023) 2) L5-S1 LESI (prior DOS, 2024, 11/15/2024, 3/7/2025) 3) Bilateral occipital nerve block with U/S guidance - (2024, 2024)   Pertinent Surgical History: N/A   Imagin) MRI Lumbar Spine (2024) - [facility]  2) MRI Thoracic Spine (2024) - [facility]  3) MRI Cervical Spine (2024)- [facility]  Electrodiagnostic Testin) Lower Extremity NCV/EMG - (2022) - [facility]  1. 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 left peroneal motor and the right peroneal motor nerves showed reduced amplitude and decreased conduction velocity. The left tibial motor nerve showed decreased conduction velocity. The right tibial motor nerve showed reduced amplitude. The left sural sensory and right sural sensory nerves were unremarkable.  3) Upper Extremity NCV/EMG - (22) - [facility]  1. very mild bilateral median nerve lesions at the wrists 2. very mild bilateral ulnar nerve lesion at the elbows.   Physician Disclaimer: I have personally reviewed and confirmed all HPI data with the patient.  [] : Post Surgical Visit: no [de-identified] : C/L MRI AT

## 2025-04-29 NOTE — IMAGING
[de-identified] : RIGHT KNEE:  Inspection: mild effusion Palpation: medial joint line tenderness, anterior tenderness Knee Range of Motion: 3-125 Strength: 5/5 Quadriceps strength, 5/5 Hamstring strength Neurological: light touch is intact throughout Ligament Stability and Special Tests: McMurrays: neg Lachman: neg Pivot Shift: neg Posterior Drawer: neg Valgus: neg Varus: neg Patella Apprehension: neg Patella Maltracking: neg   LEFT KNEE:  Inspection: mild effusion Palpation: medial joint line tenderness, anterior tenderness Knee Range of Motion: 3-125 Strength: 5/5 Quadriceps strength, 5/5 Hamstring strength Neurological: light touch is intact throughout Ligament Stability and Special Tests: McMurrays: neg Lachman: neg Pivot Shift: neg Posterior Drawer: neg Valgus: neg Varus: neg Patella Apprehension: neg Patella Maltracking: neg    LEFT SHOULDER Inspection: No swelling.  Palpation: Tenderness is noted at the bicipital groove, anterior and lateral.  Range of motion: There is pain with range of motion. , ER 55, @90ER 90, @90IR 30 Strength: There is pain, weakness, and discomfort with strength testing. Forward Flexion 3/5. Abduction 3/5. External Rotation 4/5 and Internal Rotation 5-/5  Neurological testings: motor and sensor intact distally. Ligament Stability and Special Tests:  There is positive arc of pain.  Shoulder apprehension: neg Shoulder relocation: neg Obriens test: pos Biceps Active test: neg Mix Labral Shear: neg Impingement testing: pos Chris testing: pos Whipple: pos Cross Body Adduction: neg   RIGHT WRIST and HAND Inspection: No erythema, No swelling Palpation: TTP over CMC Range of Motion: Full range of motion. Strength: normal Neurological testing: motor exam 5/5 and light touch intact. Special Testing:   Back / Spine: Inspection: No erythema and ecchymosis. Palpation: left lumbar paraspinal tenderness. Tender to palpation over the mid thoracic spine Range of motion: Near full range of motion but with mild stiffness. Pain with lateral rotation. pain at extremes of flexion Strength Testing: Weakness with left ankle dorsiflexion and EHL strength Otherwise 5/5 throughout both lower extremities with normal tone Neurological testing: light touch is intact throughout both lower extremities Straight Leg Raise: positive left / right Babiniski test: neg

## 2025-04-29 NOTE — HISTORY OF PRESENT ILLNESS
[Lower back] : lower back [Radiating] : radiating [Sharp] : sharp [Constant] : constant [Household chores] : household chores [Leisure] : leisure [Sleep] : sleep [Meds] : meds [Physical therapy] : physical therapy [Injection therapy] : injection therapy [Sitting] : sitting [Standing] : standing [Walking] : walking [Neck] : neck [9] : 9 [Stabbing] : stabbing [FreeTextEntry1] : 2025 - Patient presents for 4-week FUV.  Patient reports she continues to have low back pain. However, she reports her neck pain is now her predominant concern. She has pain in her neck traveling up to the back of her head and bilateral traps, associated with headaches. She has tried Botox with an outside neurologist, without benefit. She continues PT and acupuncture. Patient reports she followed up with Dr. Kimball who indicated she is a surgical candidate; however, she defers surgical intervention at this time. Of note, patient is following up with Dr. Nguyen for right knee pain; she reports she fell a few days ago onto her knee. She is taking Meloxicam PRN, Cymbalta daily, and Aspirin 81 mg prophylactically.  2025 - Patient presents for FUV after a L5-S1 LESI on 3/7/2025.  Patient reports she can walk more easily after the injection. She continues to have some residual pain in her low back and right hip. She denies any groin pain. Her low back pain is her predominant concern and is exacerbated with any activity. She is still under the care of several subspecialists for various conditions, including neurology for chronic headaches. She tried Botox therapy as previously advised. She is planning to establish care with Dr. Kimball soon for evaluation of her lumbar spine.  2025 - Patient presents for 8-week FUV. Patient reports multifocal pain once again.  Greatest concern today is in regard to her low back with radiation to the right lower extremity.  Feels that it is still currently manageable with a combination of physical therapy and acupuncture.  She is currently under the care of several subspecialist for various conditions including a neurologist for her chronic headaches.  Botox therapy was suggested, and she is contemplating this.  12/3/2024 - Patient presents for FUV after a L5-S1 LESI on 11/15/2024. Patient reports 65% relief of symptoms and improvement in functionality.  She reports she has not required medication since the procedure and is pleased with relief in her low back pain. She has been receiving acupuncture as well and finds that has provided significant relief to her low back pain.    Today however, her most bothersome area of pain today is her cervical pain and headaches.  She is currently undergoing treatment for vitamin B12 and vitamin D deficiencies.  She is interested in scheduling a repeat DARRELL with the hope of decreasing her pain and increasing her functionality and quality of life.   10/25/2024 - Patient presents for a FUV after a bilateral occipital nerve block with U/S guidance on 2024. Patient reports mild improvement in her headaches at this time however, she has been addressing her cervical spine and finds that has been contributing to her relief.  She was seen by neurology who determined the HAs were generating from her neck and she now has been in physical therapy 3 times/week for the past 14 weeks and will be initiating acupuncture in the next few weeks as well. Of note, patient established care with a new cardiologist and is now on a new medication regimen and reports better controlled hypertension.  Today. her most bothersome symptom is her low back pain with radiation to bilateral lower extremities. She reports burning, numbness and tingling to bilateral feet. Presents today to discuss next steps in her treatment plan for her low back pain with the hope of decreasing her pain and increasing her functionality and quality of life.   2024 - Patient presents for FUV after a bilateral occipital nerve block on 2024. Patient reports a significant reduction in the frequency and intensity of her headaches for about 3-4 days s/p occipital block.  Unfortunately, she experienced a return of symptoms essentially to baseline.  She does continue to have headaches from which she thinks could possibly related to uncontrolled hypertension.  She has been seeing cardiology and will follow up with neurology.  2024 - Patient presents for FUV after a L5-S1 LESI on 2024. Patient reports moderate reduction of her lower back and right leg pain following the epidural.  Unfortunately, patient was seen in the ER 3 days post procedure for what she describes as severe hypertension and ongoing headache.  She was evaluated and discharged home.  Patient has been dealing with headaches for quite some time now without definitive source.  Has seen her longtime neurologist Dr. Kellerman and recently had an evaluation with Dr. Salmon ([facility]) who ordered repeat imaging of the C/T/L spine as well as brain.  Headache severity is greatest at the bilateral occiput with radiation to the top of her head.  2024 - Patient presents for FUV regarding their lower back pain.  She was last seen approximately 6 months ago.  Patient reports sustained relief following her previous lumbar epidural steroid injection which lasted until approximately 1 month ago.  Now has a return of lower back with radiation down the posterior right leg stopping at the posterior calf.  Additionally, she reports a flareup of her peripheral neuropathy as per her treating neurologist.  A course of intravenous corticosteroid was suggested and completed about 5 weeks ago.  Takes Cymbalta 60 mg and Tylenol daily with no benefit; she has discontinued Lyrica due to no perceived benefit.  Of note she is undergoing further neurological workup and will be undergoing a spinal tap this week.  2023 - Patient presents for FUV after a L5-S1 LESI on 2023. Patient reports a complete resolution of pain s/p injection, she is pleased with the results of the epidural; she continues to take 100 mg of Lyrica BID.   10/26/2023 - Patient presents for FUV regarding their lower back pain.   Patient reports radiation to the right lower extremity extending to the foot.  Currently has titrated to a dose of Lyrica 200 mg daily which has reduced some of the "neuropathy sensations". She also complains of additional multifocal joint pain; greatest of which is in her right knee.  She sees Dr. Nguyen for this.  2023- Patient presents for FUV regarding their lower back pain. Patient had started taking Tramadol and felt sick with nausea and sweating days after using, she has since discontinued use; she no longer gets IV steroid infusions as well.  Still with complaints in the low back with radiation to the right lower extremity extending to the ankle/foot.  Worst pain is concentrated in the right lateral aspect of the calf. Greatest degree of difficulty with ambulation.  2023 - Patient presents for FUV regarding their lower back pain.  Patient did not start the [place]  financial constraints.  She is following with a neurologist for IV steroid infusions for tx of peripheral neuropathy.  Patient reports she gets right sided arm numbness/tingling and right foot paranesthesia's.  She is becoming frustrated by her multitude of pain issues.  She is dealing with concurrent neck, low back, knee, and neurological issues.  2023 - Patient presents to review lumbar spine MRI from 2023. The majority of the patient's pain is in the right foot, and occasionally the first digit on the left foot, there is no pain in the rest of the lower extremities, she has back pain, but it is not as prominent as the right foot pain.  Has seen neurology in the past and obtained EMG.  Planning for follow-up in upcoming week.  2023 - Patient presents for FUV after a L5-S1 LESI on 2023.  Patient reports overall 50% relief sustained following the injection.  Patient reports her pain returned to baseline after about 2 weeks. She complains of primarily axial lower back pain specifically with prolonged standing.  2023 - Patient presents for FUV after a right PM C7-T1 DARRELL on 2023. Patient reports significant pain reduction following the injection (60% reduction in pain).  She is pleased with her response.  Patient's main complaint today is her axial lower back pain that has radiation to the medial right thigh, she had taken a trip to [place] recently for a week where she did extensive walking and exacerbated her back pain; she can endure 5-10 minutes of standing before it becomes unbearable.  Has had previous neurosurgical evaluations and surgery was recommended however she defers.  2023 - Patient presents for reevaluation.  She was last seen approximately 6 months ago.  Patient to the office regarding her neck pain. Patient was referred by Dr. Arevalo. Patient was originally seen for her back in September. Patient was placed on MDP for her right upper extremity radicular pain with good relief. Patient complains of right rotation stiffness in regards to her neck. Patient here for possible interventional management of cervical region ridiculous.  Taking dual action Advil once sometimes twice daily.  2022 - The patient presents for initial evaluation regarding their low back pain. Patient was referred by Dr. Nguyen. Patient sustained a slip and fall in 2021 and landed on her buttocks. Significant flare-up in her lower back since fall.  Experiences numbness in her toes and fingertips get tingling sensation when she closes her fists. Patient has gotten trigger point injections with no relief. Leg symptoms improve when seated. Diclofenac provides relief, previously tried Celebrex and Gabapentin - no relief.   Injections: 1) C7-T1 DARRELL (2023) 2) L5-S1 LESI (prior DOS, 2024, 11/15/2024, 3/7/2025) 3) Bilateral occipital nerve block with U/S guidance - (2024, 2024)   Pertinent Surgical History: N/A   Imagin) MRI Lumbar Spine (2024) - [facility]  2) MRI Thoracic Spine (2024) - [facility]  3) MRI Cervical Spine (2024)- [facility]  Electrodiagnostic Testin) Lower Extremity NCV/EMG - (2022) - [facility]  1. 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 left peroneal motor and the right peroneal motor nerves showed reduced amplitude and decreased conduction velocity. The left tibial motor nerve showed decreased conduction velocity. The right tibial motor nerve showed reduced amplitude. The left sural sensory and right sural sensory nerves were unremarkable.  3) Upper Extremity NCV/EMG - (22) - [facility]  1. very mild bilateral median nerve lesions at the wrists 2. very mild bilateral ulnar nerve lesion at the elbows.   Physician Disclaimer: I have personally reviewed and confirmed all HPI data with the patient.  [] : Post Surgical Visit: no [de-identified] : C/L MRI AT

## 2025-04-29 NOTE — DISCUSSION/SUMMARY
[de-identified] : Bilateral X-Ray Examination of the KNEE (4 views): there are no fractures, subluxations or dislocations. Medial and Patellofemoral degenerative changes.  - Patient states she had a recent fall stepping outside of her home on 4/25.   BILATERAL KNEES R>L: - The natural progression of osteoarthritis was explained to the patient. Dx is OA in bilateral knees. We discussed the possibility of treatment options from conservative to operative. These included NSAIDS, Glucosamine and Chondrotin sulfate, as well different types of injections.  We also discussed that at some point they may progress to needed a TKA, not needed at this time. Information and pamphlets were given. - Discussed Playmaker brace to improve stability, dispensed in office. - Discussed CSI today. The risks, benefits, and alternatives to corticosteroid injections were reviewed with the patient. Risks outlined include but are not limited to infection, sepsis, bleeding, scarring, skin discoloration, temporary increase in pain, syncopal episode, failure to resolve symptoms, symptoms recurrence, allergic reaction, flare reaction, and elevation of blood sugar in diabetics. Patient understood the risks and asked to proceed with this treatment course. Jovan well in RIGHT KNEE.  - Discussed future gel injections.   Follow Up: 3 months or sooner as needed   Dx / Natural History: The patient was advised of the diagnosis.  The natural history of the pathology was explained in full to the patient in layman's terms.  Several different treatment options were discussed and explained in full to the patient including the risks and benefits of both surgical and non-surgical treatments.  All questions and concerns were answered.   Pain Guide Activities: The patient was advised to let pain guide the gradual advancement of activities.  Icing: The patient was advised to apply ice (wrapped in a towel or protective covering) to the area daily (20 minutes at a time, 2-4X/day).

## 2025-04-29 NOTE — PHYSICAL EXAM
[de-identified] : Constitutional:   - No acute distress   - Obese    Neurological:   - normal mood and affect   - alert and oriented x 3       Cardiovascular:   - grossly normal  Lumbar Spine Exam:  Inspection: erythema (-) ecchymosis (-) rashes (-) alignment: no scoliosis  Palpation: paraspinal tenderness:                    L (-); R (-) thoracic paraspinal tenderness:       L (-); R (-) sciatic nerve tenderness :                L (-); R (+) SI joint tenderness:                          L (-); R (-) GTB tenderness:                             L (-); R (-)  ROM: WNL with mild stiffness Pain with extremes of extension and flexion  Strength: Right/Left Hip Flexion: (5/5) (5/5) Quadriceps: (5/5) (5/5) Hamstrings: (5/5) (5/5) Ankle Dorsiflexion: (5/5) (5/5) EHL: (5/5) (5/5) Ankle Plantarflexion: (5/5) (5/5)  Special Tests: SLR:                 R (=); L (-) Facet loading:  R (+); L (+) WILLIAN test:     R (n/a); L (n/a) Tight Hamstrings R (+); L (+)  Neurologic: Light touch intact throughout LE  Reflexes normal and symmetric  Gait: antalgic gait, unsteady  ambulates with assistance of walker  ------------------------------------------------------------------------------ Cervical Spine Exam:   Inspection:   erythema (-)   ecchymosis (-)   rashes (-)    Palpation:                                                    Cervical paraspinal tenderness:         R (+); L (+)  Upper trapezius tenderness:              R (-); L (-)  Rhomboids tenderness:                      R (-); L (-)  Occipital Ridge:                                   R (-); L (-)  Supraspinatus tenderness:                 R (-); L (-)   ROM:   Reduced ROM all planes; crepitus throughout ROM testing pain throughout range of motion testing  Strength Testing:              Deltoid                           R (5/5); L (5/5)  Biceps:                          R (5/5); L (5/5)  Triceps:                         R (5/5); L (5/5)  Finger Abductors:         R (5/5); L (5/5)  Grasp:                           R (5/5); L (5/5)   Special Testing:  Spurling Test:                  R (=); L (=)  Facet load test:               R (+); L (+)   Neuro:  SILT throughout right upper extremity  SILT throughout left upper extremity   Reflexes:  Biceps   -           R (2+); L (2+)  Triceps  -           R (2+); L (2+)  Brachioradialis- R (2+); L (2+)     No ankle clonus

## 2025-04-29 NOTE — HISTORY OF PRESENT ILLNESS
[de-identified] : 04/28/2025: Pt here today for follow-up bilateral knees. Pain and symptoms are worse. Since last visit pt took a fall stepping out of her house on 4/25/2025. Today c/o buckling, limited ROM and pain w/ walking, R>L.  03/10/2025 : Patient is presenting today for f/u bilateral knees. Euflexxa Injection #  3 today.   3/03/2025: Patient presents today for Euflexxa #2 for bilateral knees. Patient presents with new complaint, reports she fell onto concrete coming out of her home on 2/28/25 experiencing pain in right hand, upper and lower back and left shoulder.   02/24/2025: Pt here today for follow-up for bilateral knee pain. Pain and symptoms are worsening, patient wishes to proceed with Euflexxa series #1 for bilateral knees.  01/22/2025: Pt here today for follow-up right knee. Pain and symptoms are worse. Since last visit, pt said she had her right knee buckle today while doing laundry and has had swelling since. Today she is here to discuss whether she can get a CSI or a knee brace to help her pain.  12/09/2024: Patient here to start Visco-3 series injection #2 left knee and right thumb  12/2/24: Patient here to start Visco-3 series injection #1 left knee and right thumb. ([facility] program)   10/28/24: Pt here today for follow-up for bilateral knee pain and right hand pain. Pain and symptoms are better overall. Since last visit patient received euflexxa gel series, which she had some relief for 3 months. Today c/o pain in the back of the knee and difficulty w/ knee flexion.    8/12/24: Here for Euflexxa inj #3 B/L knees  8/5/24: Here for Euflexxa inj #2 B/L knees  7/29/24: Here for Euflexxa #1 bilateral knees  7/15/24: Here for follow up of B/L knees. Continues to have pain in knees. Currently using a walker for ambulation.

## 2025-04-29 NOTE — IMAGING
[de-identified] : RIGHT KNEE:  Inspection: mild effusion Palpation: medial joint line tenderness, anterior tenderness Knee Range of Motion: 3-125 Strength: 5/5 Quadriceps strength, 5/5 Hamstring strength Neurological: light touch is intact throughout Ligament Stability and Special Tests: McMurrays: neg Lachman: neg Pivot Shift: neg Posterior Drawer: neg Valgus: neg Varus: neg Patella Apprehension: neg Patella Maltracking: neg   LEFT KNEE:  Inspection: mild effusion Palpation: medial joint line tenderness, anterior tenderness Knee Range of Motion: 3-125 Strength: 5/5 Quadriceps strength, 5/5 Hamstring strength Neurological: light touch is intact throughout Ligament Stability and Special Tests: McMurrays: neg Lachman: neg Pivot Shift: neg Posterior Drawer: neg Valgus: neg Varus: neg Patella Apprehension: neg Patella Maltracking: neg    LEFT SHOULDER Inspection: No swelling.  Palpation: Tenderness is noted at the bicipital groove, anterior and lateral.  Range of motion: There is pain with range of motion. , ER 55, @90ER 90, @90IR 30 Strength: There is pain, weakness, and discomfort with strength testing. Forward Flexion 3/5. Abduction 3/5. External Rotation 4/5 and Internal Rotation 5-/5  Neurological testings: motor and sensor intact distally. Ligament Stability and Special Tests:  There is positive arc of pain.  Shoulder apprehension: neg Shoulder relocation: neg Obriens test: pos Biceps Active test: neg Mix Labral Shear: neg Impingement testing: pos Chris testing: pos Whipple: pos Cross Body Adduction: neg   RIGHT WRIST and HAND Inspection: No erythema, No swelling Palpation: TTP over CMC Range of Motion: Full range of motion. Strength: normal Neurological testing: motor exam 5/5 and light touch intact. Special Testing:   Back / Spine: Inspection: No erythema and ecchymosis. Palpation: left lumbar paraspinal tenderness. Tender to palpation over the mid thoracic spine Range of motion: Near full range of motion but with mild stiffness. Pain with lateral rotation. pain at extremes of flexion Strength Testing: Weakness with left ankle dorsiflexion and EHL strength Otherwise 5/5 throughout both lower extremities with normal tone Neurological testing: light touch is intact throughout both lower extremities Straight Leg Raise: positive left / right Babiniski test: neg

## 2025-04-29 NOTE — HISTORY OF PRESENT ILLNESS
[de-identified] : 04/28/2025: Pt here today for follow-up bilateral knees. Pain and symptoms are worse. Since last visit pt took a fall stepping out of her house on 4/25/2025. Today c/o buckling, limited ROM and pain w/ walking, R>L.  03/10/2025 : Patient is presenting today for f/u bilateral knees. Euflexxa Injection #  3 today.   3/03/2025: Patient presents today for Euflexxa #2 for bilateral knees. Patient presents with new complaint, reports she fell onto concrete coming out of her home on 2/28/25 experiencing pain in right hand, upper and lower back and left shoulder.   02/24/2025: Pt here today for follow-up for bilateral knee pain. Pain and symptoms are worsening, patient wishes to proceed with Euflexxa series #1 for bilateral knees.  01/22/2025: Pt here today for follow-up right knee. Pain and symptoms are worse. Since last visit, pt said she had her right knee buckle today while doing laundry and has had swelling since. Today she is here to discuss whether she can get a CSI or a knee brace to help her pain.  12/09/2024: Patient here to start Visco-3 series injection #2 left knee and right thumb  12/2/24: Patient here to start Visco-3 series injection #1 left knee and right thumb. ([facility] program)   10/28/24: Pt here today for follow-up for bilateral knee pain and right hand pain. Pain and symptoms are better overall. Since last visit patient received euflexxa gel series, which she had some relief for 3 months. Today c/o pain in the back of the knee and difficulty w/ knee flexion.    8/12/24: Here for Euflexxa inj #3 B/L knees  8/5/24: Here for Euflexxa inj #2 B/L knees  7/29/24: Here for Euflexxa #1 bilateral knees  7/15/24: Here for follow up of B/L knees. Continues to have pain in knees. Currently using a walker for ambulation.

## 2025-04-29 NOTE — ASSESSMENT
[FreeTextEntry1] : A discussion regarding available pain management treatment options occurred with the patient. These included interventional, rehabilitative, pharmacological, and alternative modalities. We will proceed with the following:  Interventional treatment options: - Proceed with C7-T1 DARRELL (40 mg Kenalog) with fluoroscopic guidance - Would likely proceed with bilateral L4-L5, 5-S1 facet joint MBB for ongoing axial low back pain - Would likely proceed with repeat right PM L5-S1 LESI (80 mg Kenalog) with return of severe LE radicular pain - Patient has been counseled on several occasions regarding limitations of corticosteroid administration; we have suggested that she reserve injection therapies for episodes of severe pain exacerbations - she verbalizes understanding of this - Previously discussed realistic expectations of interventional pain therapies in the setting of multilevel central canal stenosis - see additional instructions below  Rehabilitative options: - Completed recent physical therapy trials with modest relief - encouraged participation in active HEP as tolerated  Medication based treatment options: - continue Tylenol 500-1000 mg up to TID on PRN basis - Continue meloxicam 7.5-15 mg daily as needed; counseled patient to discuss consistent NSAID use with her cardiologist - Continue Cymbalta 90 mg daily, prescribed by neurology - Failed previous trials of Lyrica and gabapentin - see additional instructions below  Complementary treatment options: - Weight management and lifestyle modifications discussed - Continue acupuncture therapy as directed - Dr. Valentino Stevenson  Behavioral pain treatment options: - Patient has been advised to pursue psychology evaluation; referral provided at prior visit - she has deferred to this point - Patient was counseled regarding alternative coping strategies to deal with her chronic multifocal pain - Alprazolam 0.25 mg QHS as needed for anxiety  Additional treatment recommendations as follows: - Follow-up with orthopedic surgical subspecialist as directed - Patient was counseled as to red flag signs and when to seek urgent care - Follow-up with neurology as directed regarding chronic headaches - Follow up 1-2 weeks post injection for assessment of efficacy and further treatment recommendations  We have discussed the risks, benefits, and alternatives NSAID therapy including but not limited to the risk of bleeding, thrombosis, gastric mucosal irritation/ulceration, allergic reaction and kidney dysfunction; the patient verbalizes an understanding.  The risks, benefits and alternatives of the proposed procedure were explained in detail with the patient. The risks outlined include but are not limited to infection, bleeding, post- dural puncture headache, nerve injury, a temporary increase in pain, failure to resolve symptoms, need for future interventions, allergic reaction, and possible elevation of blood sugar in diabetics if using corticosteroid.  All questions were answered to patient's apparent satisfaction, and he/she verbalized an understanding.  I, Kateryna Espinoza, acting as scribe, attest that this documentation has been prepared under the direction and in the presence of Provider Alverto Barney DO.  The documentation recorded by the scribe, in my presence, accurately reflects the service I personally performed, and the decisions made by me with my edits as appropriate.

## 2025-05-01 NOTE — PROCEDURE
[de-identified] : Acupuncture treatment: Baldry technique with multiple needle placement to the cervical paraspinal, lumbar paraspinal and gluteal musculature with UV lamp x 45 minutes Paralumbar chain (bladder meridian) with ES x 45 minutes K3-HT3 SI4-BL60 with ES x 45 minutes BL 10, SP 6, ST 36, LR 3, LI 4, BL 59, omega 2, Cooper men Patient tolerated procedure well

## 2025-05-09 NOTE — PROCEDURE
[de-identified] : Acupuncture treatment: Baldry technique with multiple needle placement to the cervical paraspinal, lumbar paraspinal and gluteal musculature with UV lamp x 45 minutes Paralumbar chain (bladder meridian) with ES x 45 minutes K3-HT3 SI4-BL60 with ES x 45 minutes BL 10, SP 6, ST 36, LR 3, LI 4, BL 59, omega 2, Cooper men Patient tolerated procedure well

## 2025-05-15 NOTE — PROCEDURE
[de-identified] : Acupuncture treatment: Baldry technique with multiple needle placement to the cervical paraspinal, lumbar paraspinal and gluteal musculature with UV lamp x 45 minutes Paralumbar chain (bladder meridian) with ES x 45 minutes K3-HT3 SI4-BL60 with ES x 45 minutes BL 10, SP 6, ST 36, LR 3, LI 4, BL 59, omega 2, Cooper men Patient tolerated procedure well

## 2025-05-21 NOTE — PROCEDURE
[FreeTextEntry3] : Date of Service: 05/21/2025   Account: [id number]  Patient: ISMAEL WALDEN   YOB: 1950  Age: 74 years  Surgeon:      Alverto Barney D.O.  Assistant:    None  Pre-Operative Diagnosis:         Cervical Radiculopathy (M54.12)  Post Operative Diagnosis:       Cervical Radiculopathy (M54.12)  Procedure:             Cervical (C7-T1) interlaminar epidural steroid injection under fluoroscopic guidance  Anesthesia:  MAC  This procedure was carried out using fluoroscopic guidance.  The risks and benefits of the procedure were discussed extensively with the patient.  The consent of the patient was obtained and the following procedure was performed.  A timeout was performed with all essential staff present and the site and side were verified.  The patient was placed in the prone position and optimized to patient comfort.  The cervical area was prepped and draped in a sterile fashion.  The fluoroscope visualized the C7-T1 interspace using slight cephalad-caudad angulation and this area was marked.  Using sterile technique, the superficial skin was anesthetized with 1% Lidocaine.  A 20-gauge 3.5-inch Tuohy needle was advanced under fluoroscopy until ligament was engaged.  Using a contralateral oblique view, a "loss of resistance" to air technique was utilized in order to gain access to the epidural space.  After negative aspiration for heme and CSF, 1 cc of Omnipaque contrast was administered and the appropriate cervical epidurogram was obtained in the ELENITA and A/P view as well as digital subtraction angiography.  A total injectate of 3 cc of preservative free normal saline and 40 mg of Kenalog was then injected into the epidural space while maintaining meaningful verbal contact with the patient.    Vital signs remained normal throughout the procedure.  The patient tolerated the procedure well.  There were no immediate complications from the performed procedure.  The patient was instructed to apply ice over the injection sites for twenty minutes every two hours for the next 24 hours.  Disposition:      1. The patient was advised to F/U in 1-2 weeks to assess the response to the injection.      2. The patient was also instructed to contact me immediately if there were any concerns related to the procedure performed.

## 2025-06-03 NOTE — PHYSICAL EXAM
[de-identified] : Constitutional:   - No acute distress   - Obese    Neurological:   - normal mood and affect   - alert and oriented x 3       Cardiovascular:   - grossly normal  Lumbar Spine Exam:  Inspection: erythema (-) ecchymosis (-) rashes (-) alignment: no scoliosis  Palpation: paraspinal tenderness:                    L (-); R (-) thoracic paraspinal tenderness:       L (-); R (-) sciatic nerve tenderness :                L (-); R (-) SI joint tenderness:                          L (-); R (-) GTB tenderness:                             L (-); R (-)  ROM: WNL with mild stiffness Pain with extremes of extension and flexion  Strength: Right/Left Hip Flexion: (5/5) (5/5) Quadriceps: (5/5) (5/5) Hamstrings: (5/5) (5/5) Ankle Dorsiflexion: (5/5) (5/5) EHL: (5/5) (5/5) Ankle Plantarflexion: (5/5) (5/5)  Special Tests: SLR:                 R (=); L (-) Facet loading:  R (+); L (+) WILLIAN test:     R (n/a); L (n/a) Tight Hamstrings R (+); L (+)  Neurologic: Light touch intact throughout LE  Reflexes normal and symmetric  Gait: antalgic gait, unsteady  ambulates with assistance of walker  ------------------------------------------------------------------------------ Cervical Spine Exam:   Inspection:   erythema (-)   ecchymosis (-)   rashes (-)    Palpation:                                                    Cervical paraspinal tenderness:         R (+); L (+)  Upper trapezius tenderness:              R (-); L (-)  Rhomboids tenderness:                      R (-); L (-)  Occipital Ridge:                                   R (-); L (-)  Supraspinatus tenderness:                 R (-); L (-)   ROM:   Reduced ROM all planes; crepitus throughout ROM testing pain throughout range of motion testing  Strength Testing:              Deltoid                           R (5/5); L (5/5)  Biceps:                          R (5/5); L (5/5)  Triceps:                         R (5/5); L (5/5)  Finger Abductors:         R (5/5); L (5/5)  Grasp:                           R (5/5); L (5/5)   Special Testing:  Spurling Test:                  R (-); L (-)  Facet load test:               R (+); L (+)   Neuro:  SILT throughout right upper extremity  SILT throughout left upper extremity   Reflexes:  Biceps   -           R (2+); L (2+)  Triceps  -           R (2+); L (2+)  Brachioradialis- R (2+); L (2+)     No ankle clonus

## 2025-06-03 NOTE — HISTORY OF PRESENT ILLNESS
[Neck] : neck [Radiating] : radiating [Stabbing] : stabbing [Constant] : constant [Household chores] : household chores [Leisure] : leisure [Sleep] : sleep [Meds] : meds [Physical therapy] : physical therapy [Injection therapy] : injection therapy [Sitting] : sitting [10] : 10 [Lying in bed] : lying in bed [FreeTextEntry1] : 6/3/2025 - Patient presents for FUV after a C7-T1 DARRELL on 2025. Patient denies any meaningful relief following the procedure. Her symptoms remain stable since her prior visit; she continues to have neck pain traveling to the back of her head.  She denies any radiation of pain into the upper extremities.  She also continues to have axial low back pain. She reports difficulty sleeping due to pain. She continues to follow up with neurology for her neck pain and headaches.  Had neurology consultation with Dr. Mas.  She started taking Trazadone per her Internist without significant benefit.  2025 - Patient presents for 4-week FUV.  Patient reports she continues to have low back pain. However, she reports her neck pain is now her predominant concern. She has pain in her neck traveling up to the back of her head and bilateral traps, associated with headaches. She has tried Botox with an outside neurologist, without benefit. She continues PT and acupuncture. Patient reports she followed up with Dr. Kimball who indicated she is a surgical candidate; however, she defers surgical intervention at this time. Of note, patient is following up with Dr. Nguyen for right knee pain; she reports she fell a few days ago onto her knee. She is taking Meloxicam PRN, Cymbalta daily, and Aspirin 81 mg prophylactically.  2025 - Patient presents for FUV after a L5-S1 LESI on 3/7/2025.  Patient reports she can walk more easily after the injection. She continues to have some residual pain in her low back and right hip. She denies any groin pain. Her low back pain is her predominant concern and is exacerbated with any activity. She is still under the care of several subspecialists for various conditions, including neurology for chronic headaches. She tried Botox therapy as previously advised. She is planning to establish care with Dr. Kimball soon for evaluation of her lumbar spine.  2025 - Patient presents for 8-week FUV. Patient reports multifocal pain once again.  Greatest concern today is in regard to her low back with radiation to the right lower extremity.  Feels that it is still currently manageable with a combination of physical therapy and acupuncture.  She is currently under the care of several subspecialist for various conditions including a neurologist for her chronic headaches.  Botox therapy was suggested, and she is contemplating this.  12/3/2024 - Patient presents for FUV after a L5-S1 LESI on 11/15/2024. Patient reports 65% relief of symptoms and improvement in functionality.  She reports she has not required medication since the procedure and is pleased with relief in her low back pain. She has been receiving acupuncture as well and finds that has provided significant relief to her low back pain.    Today however, her most bothersome area of pain today is her cervical pain and headaches.  She is currently undergoing treatment for vitamin B12 and vitamin D deficiencies.  She is interested in scheduling a repeat DARRELL with the hope of decreasing her pain and increasing her functionality and quality of life.   10/25/2024 - Patient presents for a FUV after a bilateral occipital nerve block with U/S guidance on 2024. Patient reports mild improvement in her headaches at this time however, she has been addressing her cervical spine and finds that has been contributing to her relief.  She was seen by neurology who determined the HAs were generating from her neck and she now has been in physical therapy 3 times/week for the past 14 weeks and will be initiating acupuncture in the next few weeks as well. Of note, patient established care with a new cardiologist and is now on a new medication regimen and reports better controlled hypertension.  Today. her most bothersome symptom is her low back pain with radiation to bilateral lower extremities. She reports burning, numbness and tingling to bilateral feet. Presents today to discuss next steps in her treatment plan for her low back pain with the hope of decreasing her pain and increasing her functionality and quality of life.   2024 - Patient presents for FUV after a bilateral occipital nerve block on 2024. Patient reports a significant reduction in the frequency and intensity of her headaches for about 3-4 days s/p occipital block.  Unfortunately, she experienced a return of symptoms essentially to baseline.  She does continue to have headaches from which she thinks could possibly related to uncontrolled hypertension.  She has been seeing cardiology and will follow up with neurology.  2024 - Patient presents for FUV after a L5-S1 LESI on 2024. Patient reports moderate reduction of her lower back and right leg pain following the epidural.  Unfortunately, patient was seen in the ER 3 days post procedure for what she describes as severe hypertension and ongoing headache.  She was evaluated and discharged home.  Patient has been dealing with headaches for quite some time now without definitive source.  Has seen her longtime neurologist Dr. Kellerman and recently had an evaluation with Dr. Salmon ([facility]) who ordered repeat imaging of the C/T/L spine as well as brain.  Headache severity is greatest at the bilateral occiput with radiation to the top of her head.  2024 - Patient presents for FUV regarding their lower back pain.  She was last seen approximately 6 months ago.  Patient reports sustained relief following her previous lumbar epidural steroid injection which lasted until approximately 1 month ago.  Now has a return of lower back with radiation down the posterior right leg stopping at the posterior calf.  Additionally, she reports a flareup of her peripheral neuropathy as per her treating neurologist.  A course of intravenous corticosteroid was suggested and completed about 5 weeks ago.  Takes Cymbalta 60 mg and Tylenol daily with no benefit; she has discontinued Lyrica due to no perceived benefit.  Of note she is undergoing further neurological workup and will be undergoing a spinal tap this week.  2023 - Patient presents for FUV after a L5-S1 LESI on 2023. Patient reports a complete resolution of pain s/p injection, she is pleased with the results of the epidural; she continues to take 100 mg of Lyrica BID.   10/26/2023 - Patient presents for FUV regarding their lower back pain.   Patient reports radiation to the right lower extremity extending to the foot.  Currently has titrated to a dose of Lyrica 200 mg daily which has reduced some of the "neuropathy sensations". She also complains of additional multifocal joint pain; greatest of which is in her right knee.  She sees Dr. Nguyen for this.  2023- Patient presents for FUV regarding their lower back pain. Patient had started taking Tramadol and felt sick with nausea and sweating days after using, she has since discontinued use; she no longer gets IV steroid infusions as well.  Still with complaints in the low back with radiation to the right lower extremity extending to the ankle/foot.  Worst pain is concentrated in the right lateral aspect of the calf. Greatest degree of difficulty with ambulation.  2023 - Patient presents for FUV regarding their lower back pain.  Patient did not start the [place]  financial constraints.  She is following with a neurologist for IV steroid infusions for tx of peripheral neuropathy.  Patient reports she gets right sided arm numbness/tingling and right foot paranesthesia's.  She is becoming frustrated by her multitude of pain issues.  She is dealing with concurrent neck, low back, knee, and neurological issues.  2023 - Patient presents to review lumbar spine MRI from 2023. The majority of the patient's pain is in the right foot, and occasionally the first digit on the left foot, there is no pain in the rest of the lower extremities, she has back pain, but it is not as prominent as the right foot pain.  Has seen neurology in the past and obtained EMG.  Planning for follow-up in upcoming week.  2023 - Patient presents for FUV after a L5-S1 LESI on 2023.  Patient reports overall 50% relief sustained following the injection.  Patient reports her pain returned to baseline after about 2 weeks. She complains of primarily axial lower back pain specifically with prolonged standing.  2023 - Patient presents for FUV after a right PM C7-T1 DARRELL on 2023. Patient reports significant pain reduction following the injection (60% reduction in pain).  She is pleased with her response.  Patient's main complaint today is her axial lower back pain that has radiation to the medial right thigh, she had taken a trip to [place] recently for a week where she did extensive walking and exacerbated her back pain; she can endure 5-10 minutes of standing before it becomes unbearable.  Has had previous neurosurgical evaluations and surgery was recommended however she defers.  2023 - Patient presents for reevaluation.  She was last seen approximately 6 months ago.  Patient to the office regarding her neck pain. Patient was referred by Dr. Arevalo. Patient was originally seen for her back in September. Patient was placed on MDP for her right upper extremity radicular pain with good relief. Patient complains of right rotation stiffness in regards to her neck. Patient here for possible interventional management of cervical region ridiculous.  Taking dual action Advil once sometimes twice daily.  2022 - The patient presents for initial evaluation regarding their low back pain. Patient was referred by Dr. Nguyen. Patient sustained a slip and fall in 2021 and landed on her buttocks. Significant flare-up in her lower back since fall.  Experiences numbness in her toes and fingertips get tingling sensation when she closes her fists. Patient has gotten trigger point injections with no relief. Leg symptoms improve when seated. Diclofenac provides relief, previously tried Celebrex and Gabapentin - no relief.   Injections: 1) C7-T1 DARRELL (2023, 2025) 2) L5-S1 LESI (prior DOS, 2024, 11/15/2024, 3/7/2025) 3) Bilateral occipital nerve block with U/S guidance - (2024, 2024)   Pertinent Surgical History: N/A   Imagin) MRI Lumbar Spine (2024) - [facility]  2) MRI Thoracic Spine (2024) - [facility]  3) MRI Cervical Spine (2024)- [facility]  Electrodiagnostic Testin) Lower Extremity NCV/EMG - (2022) - [facility]  1. 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 left peroneal motor and the right peroneal motor nerves showed reduced amplitude and decreased conduction velocity. The left tibial motor nerve showed decreased conduction velocity. The right tibial motor nerve showed reduced amplitude. The left sural sensory and right sural sensory nerves were unremarkable.  3) Upper Extremity NCV/EMG - (22) - [facility]  1. very mild bilateral median nerve lesions at the wrists 2. very mild bilateral ulnar nerve lesion at the elbows.   Physician Disclaimer: I have personally reviewed and confirmed all HPI data with the patient.  [] : Post Surgical Visit: no [FreeTextEntry7] : into the shoulders [de-identified] : C/L MRI AT

## 2025-06-03 NOTE — ASSESSMENT
[FreeTextEntry1] : A discussion regarding available pain management treatment options occurred with the patient. These included interventional, rehabilitative, pharmacological, and alternative modalities. We will proceed with the following:  Interventional treatment options: - Proceed with bilateral L4-L5, L5-S1 facet joint MBB with fluoroscopic guidance; proceed to RFA if adequate relief - Would likely proceed with repeat right PM L5-S1 LESI (80 mg Kenalog) with return of severe LE radicular pain - Patient has been counseled on several occasions regarding limitations of corticosteroid administration; we have suggested that she reserve injection therapies for episodes of severe pain exacerbations - she verbalizes understanding of this - Previously discussed realistic expectations of interventional pain modalities in the setting of overall degenerative spinal pathology - see additional instructions below  Rehabilitative options: - Completed extensive physical therapy for the lumbar and cervical spine with variable relief - encouraged participation in active HEP as tolerated  Medication based treatment options: - continue Tylenol 500-1000 mg up to TID on PRN basis - Continue meloxicam 7.5-15 mg daily as needed; counseled patient to discuss consistent NSAID use with her cardiologist - Continue Cymbalta 90 mg daily, prescribed by neurology - Failed previous trials of Lyrica and gabapentin - see additional instructions below  Complementary treatment options: - Weight management and lifestyle modifications discussed - Continue acupuncture therapy as directed-Dr. Valentino Stevenson  Behavioral pain treatment options: - Patient has been advised to pursue psychology evaluation; referral provided at prior visit - she has deferred to this point - Patient was counseled regarding alternative coping strategies to deal with her chronic multifocal pain  Additional treatment recommendations as follows: - Follow-up with orthopedic surgical subspecialists as directed - Patient was counseled as to red flag signs and when to seek urgent care - Follow-up with neurology (Dr. Mas) as directed regarding chronic headaches - Follow up 1-2 weeks post injection for assessment of efficacy and further treatment recommendations  We have discussed the risks, benefits, and alternatives NSAID therapy including but not limited to the risk of bleeding, thrombosis, gastric mucosal irritation/ulceration, allergic reaction and kidney dysfunction; the patient verbalizes an understanding.  The risks, benefits and alternatives of the proposed procedure were explained in detail with the patient. The risks outlined include but are not limited to infection, bleeding, post- dural puncture headache, nerve injury, a temporary increase in pain, failure to resolve symptoms, need for future interventions, allergic reaction, and possible elevation of blood sugar in diabetics if using corticosteroid.  All questions were answered to patient's apparent satisfaction, and he/she verbalized an understanding.  Patient presents with axial lumbar pain that has not responded to 3 months of conservative therapy including physical therapy or NSAID therapy.  The pain is interfering with activities of daily living and functionality.  There is no radicular pain.  The pain is exacerbated by facet loading.  Positive Kemps maneuver which is defined by pain reproduction with extension and rotation of the lumbar spine to the affected side.  The patient has not had a vertebral fusion at the levels of the proposed treatment.  There is no unexplained neurologic deficit.  There is no history of systemic infection, unstable medical condition, bleeding tendency, or local infection.  The injection is being performed to diagnose the facet joint as the source of the individual's pain, in preparation for a radiofrequency ablation.  I, Kateryna Espinoza, acting as scribe, attest that this documentation has been prepared under the direction and in the presence of Provider Alverto Barney DO.  The documentation recorded by the scribe, in my presence, accurately reflects the service I personally performed, and the decisions made by me with my edits as appropriate.

## 2025-06-06 NOTE — PROCEDURE
[de-identified] : Acupuncture treatment: Baldry technique with multiple needle placement to the cervical paraspinal, lumbar paraspinal and gluteal musculature with UV lamp x 45 minutes Paralumbar chain (bladder meridian) with ES x 45 minutes K3-HT3 SI4-BL60 with ES x 45 minutes BL 10, SP 6, ST 36, LR 3, LI 4, BL 59, omega 2, Cooper men Patient tolerated procedure well

## 2025-06-08 NOTE — HISTORY OF PRESENT ILLNESS
[de-identified] : 06/02/2025: Pt here today for follow-up bilateral knees. Pain and symptoms are the same. Since last visit pt had some relief from CSI. Today c/o persistent pain, she would like to move forward w/ Visco supplementation.  Appario bilateral knees. Using Nitro walker. R>L knee swelling. Pain is progressively getting worse. L knee has posterior pain. Increased difficulty with stairs. She is doing acupuncture for her neck and back. She continues to do PT.    04/28/2025: Pt here today for follow-up bilateral knees. Pain and symptoms are worse. Since last visit pt took a fall stepping out of her house on 4/25/2025. Today c/o buckling, limited ROM and pain w/ walking, R>L.  03/10/2025 : Patient is presenting today for f/u bilateral knees. Euflexxa Injection #  3 today.   3/03/2025: Patient presents today for Euflexxa #2 for bilateral knees. Patient presents with new complaint, reports she fell onto concrete coming out of her home on 2/28/25 experiencing pain in right hand, upper and lower back and left shoulder.   02/24/2025: Pt here today for follow-up for bilateral knee pain. Pain and symptoms are worsening, patient wishes to proceed with Euflexxa series #1 for bilateral knees.  01/22/2025: Pt here today for follow-up right knee. Pain and symptoms are worse. Since last visit, pt said she had her right knee buckle today while doing laundry and has had swelling since. Today she is here to discuss whether she can get a CSI or a knee brace to help her pain.  12/09/2024: Patient here to start Visco-3 series injection #2 left knee and right thumb  12/2/24: Patient here to start Visco-3 series injection #1 left knee and right thumb. (Appario program)   10/28/24: Pt here today for follow-up for bilateral knee pain and right hand pain. Pain and symptoms are better overall. Since last visit patient received euflexxa gel series, which she had some relief for 3 months. Today c/o pain in the back of the knee and difficulty w/ knee flexion.    8/12/24: Here for Euflexxa inj #3 B/L knees  8/5/24: Here for Euflexxa inj #2 B/L knees  7/29/24: Here for Euflexxa #1 bilateral knees  7/15/24: Here for follow up of B/L knees. Continues to have pain in knees. Currently using a walker for ambulation.

## 2025-06-08 NOTE — HISTORY OF PRESENT ILLNESS
[de-identified] : 06/02/2025: Pt here today for follow-up bilateral knees. Pain and symptoms are the same. Since last visit pt had some relief from CSI. Today c/o persistent pain, she would like to move forward w/ Visco supplementation.  Appario bilateral knees. Using Nitro walker. R>L knee swelling. Pain is progressively getting worse. L knee has posterior pain. Increased difficulty with stairs. She is doing acupuncture for her neck and back. She continues to do PT.    04/28/2025: Pt here today for follow-up bilateral knees. Pain and symptoms are worse. Since last visit pt took a fall stepping out of her house on 4/25/2025. Today c/o buckling, limited ROM and pain w/ walking, R>L.  03/10/2025 : Patient is presenting today for f/u bilateral knees. Euflexxa Injection #  3 today.   3/03/2025: Patient presents today for Euflexxa #2 for bilateral knees. Patient presents with new complaint, reports she fell onto concrete coming out of her home on 2/28/25 experiencing pain in right hand, upper and lower back and left shoulder.   02/24/2025: Pt here today for follow-up for bilateral knee pain. Pain and symptoms are worsening, patient wishes to proceed with Euflexxa series #1 for bilateral knees.  01/22/2025: Pt here today for follow-up right knee. Pain and symptoms are worse. Since last visit, pt said she had her right knee buckle today while doing laundry and has had swelling since. Today she is here to discuss whether she can get a CSI or a knee brace to help her pain.  12/09/2024: Patient here to start Visco-3 series injection #2 left knee and right thumb  12/2/24: Patient here to start Visco-3 series injection #1 left knee and right thumb. (Appario program)   10/28/24: Pt here today for follow-up for bilateral knee pain and right hand pain. Pain and symptoms are better overall. Since last visit patient received euflexxa gel series, which she had some relief for 3 months. Today c/o pain in the back of the knee and difficulty w/ knee flexion.    8/12/24: Here for Euflexxa inj #3 B/L knees  8/5/24: Here for Euflexxa inj #2 B/L knees  7/29/24: Here for Euflexxa #1 bilateral knees  7/15/24: Here for follow up of B/L knees. Continues to have pain in knees. Currently using a walker for ambulation.

## 2025-06-08 NOTE — PROCEDURE
[FreeTextEntry3] : Patient Identification Name/: Verbal with patient and/or family   Procedure Verification: Procedure confirmed with patient or family/designee Consent for procedure: Verbal Consent Given Relevant documentation completed, reviewed, and signed Clinical indications for procedure confirmed   Time-out with all members of procedure team immediately prior to procedure: Correct patient identified. Agreement on procedure. Correct side and site.   KNEE INJECTION (STEROID) - LEFT After verbal consent and identification of the correct patient and correct site, the superolateral LEFT knee was prepped using alcohol. This was allowed time to air dry. A mixture of 1cc Celestone 6mg/ml, 2cc Lidocaine 1%, and 2cc Bupivacaine 0.5% was injected with a sterile 22G needle after ethyl chloride spray for skin anesthesia. The patient tolerated the procedure well. After-care instructions were provided and included instructions to ice the area and to call if redness, pain, or fever develop.

## 2025-06-08 NOTE — IMAGING
[de-identified] : RIGHT KNEE:  Inspection: mild effusion Palpation: medial joint line tenderness, anterior tenderness Knee Range of Motion: 3-125 Strength: 5/5 Quadriceps strength, 5/5 Hamstring strength Neurological: light touch is intact throughout Ligament Stability and Special Tests: McMurrays: neg Lachman: neg Pivot Shift: neg Posterior Drawer: neg Valgus: neg Varus: neg Patella Apprehension: neg Patella Maltracking: neg   LEFT KNEE:  Inspection: mild effusion Palpation: medial joint line tenderness, anterior tenderness Knee Range of Motion: 3-125 Strength: 5/5 Quadriceps strength, 5/5 Hamstring strength Neurological: light touch is intact throughout Ligament Stability and Special Tests: McMurrays: neg Lachman: neg Pivot Shift: neg Posterior Drawer: neg Valgus: neg Varus: neg Patella Apprehension: neg Patella Maltracking: neg    LEFT SHOULDER Inspection: No swelling.  Palpation: Tenderness is noted at the bicipital groove, anterior and lateral.  Range of motion: There is pain with range of motion. , ER 55, @90ER 90, @90IR 30 Strength: There is pain, weakness, and discomfort with strength testing. Forward Flexion 3/5. Abduction 3/5. External Rotation 4/5 and Internal Rotation 5-/5  Neurological testings: motor and sensor intact distally. Ligament Stability and Special Tests:  There is positive arc of pain.  Shoulder apprehension: neg Shoulder relocation: neg Obriens test: pos Biceps Active test: neg Mix Labral Shear: neg Impingement testing: pos Chris testing: pos Whipple: pos Cross Body Adduction: neg   RIGHT WRIST and HAND Inspection: No erythema, No swelling Palpation: TTP over CMC Range of Motion: Full range of motion. Strength: normal Neurological testing: motor exam 5/5 and light touch intact. Special Testing:   Back / Spine: Inspection: No erythema and ecchymosis. Palpation: left lumbar paraspinal tenderness. Tender to palpation over the mid thoracic spine Range of motion: Near full range of motion but with mild stiffness. Pain with lateral rotation. pain at extremes of flexion Strength Testing: Weakness with left ankle dorsiflexion and EHL strength Otherwise 5/5 throughout both lower extremities with normal tone Neurological testing: light touch is intact throughout both lower extremities Straight Leg Raise: positive left / right Babiniski test: neg

## 2025-06-08 NOTE — DISCUSSION/SUMMARY
[de-identified] : Bilateral X-Ray Examination of the KNEE (4 views): there are no fractures, subluxations or dislocations. Medial and Patellofemoral degenerative changes.  - Patient states she had a recent fall stepping outside of her home on 4/25.   BILATERAL KNEES R>L: - The natural progression of osteoarthritis was explained to the patient. Dx is OA in bilateral knees. We discussed the possibility of treatment options from conservative to operative. These included NSAIDS, Glucosamine and Chondroitin sulfate, as well different types of injections.  We also discussed that at some point they may progress to need a TKA, not needed at this time. Information and pamphlets were given. - Discussed Playmaker brace to improve stability, dispensed in office. - Discussed it is too soon to repeat RIGHT knee CSI today. Timing and frequency of repeat CSIs reviewed.  - The risks, benefits and alternatives to corticosteroid injections were reviewed with patient.  Risks outlined include, but, are not limited to infection, sepsis, bleeding, scarring, skin discoloration, temporary increase in pain, syncopal episode, failure to resolve symptoms, symptoms recurrence, allergic reaction, flare reaction and elevation of blood sugar in diabetics. Patient understood the risk and asked to proceed with this treatment course.   Tolerated well today in LEFT KNEE. Post injection instructions. - Discussed future gel injections.  Follow Up: to start visco series  Dx / Natural History: The patient was advised of the diagnosis.  The natural history of the pathology was explained in full to the patient in layman's terms.  Several different treatment options were discussed and explained in full to the patient including the risks and benefits of both surgical and non-surgical treatments.  All questions and concerns were answered.   Pain Guide Activities: The patient was advised to let pain guide the gradual advancement of activities.  Icing: The patient was advised to apply ice (wrapped in a towel or protective covering) to the area daily (20 minutes at a time, 2-4X/day).

## 2025-06-08 NOTE — IMAGING
[de-identified] : RIGHT KNEE:  Inspection: mild effusion Palpation: medial joint line tenderness, anterior tenderness Knee Range of Motion: 3-125 Strength: 5/5 Quadriceps strength, 5/5 Hamstring strength Neurological: light touch is intact throughout Ligament Stability and Special Tests: McMurrays: neg Lachman: neg Pivot Shift: neg Posterior Drawer: neg Valgus: neg Varus: neg Patella Apprehension: neg Patella Maltracking: neg   LEFT KNEE:  Inspection: mild effusion Palpation: medial joint line tenderness, anterior tenderness Knee Range of Motion: 3-125 Strength: 5/5 Quadriceps strength, 5/5 Hamstring strength Neurological: light touch is intact throughout Ligament Stability and Special Tests: McMurrays: neg Lachman: neg Pivot Shift: neg Posterior Drawer: neg Valgus: neg Varus: neg Patella Apprehension: neg Patella Maltracking: neg    LEFT SHOULDER Inspection: No swelling.  Palpation: Tenderness is noted at the bicipital groove, anterior and lateral.  Range of motion: There is pain with range of motion. , ER 55, @90ER 90, @90IR 30 Strength: There is pain, weakness, and discomfort with strength testing. Forward Flexion 3/5. Abduction 3/5. External Rotation 4/5 and Internal Rotation 5-/5  Neurological testings: motor and sensor intact distally. Ligament Stability and Special Tests:  There is positive arc of pain.  Shoulder apprehension: neg Shoulder relocation: neg Obriens test: pos Biceps Active test: neg Mix Labral Shear: neg Impingement testing: pos Chris testing: pos Whipple: pos Cross Body Adduction: neg   RIGHT WRIST and HAND Inspection: No erythema, No swelling Palpation: TTP over CMC Range of Motion: Full range of motion. Strength: normal Neurological testing: motor exam 5/5 and light touch intact. Special Testing:   Back / Spine: Inspection: No erythema and ecchymosis. Palpation: left lumbar paraspinal tenderness. Tender to palpation over the mid thoracic spine Range of motion: Near full range of motion but with mild stiffness. Pain with lateral rotation. pain at extremes of flexion Strength Testing: Weakness with left ankle dorsiflexion and EHL strength Otherwise 5/5 throughout both lower extremities with normal tone Neurological testing: light touch is intact throughout both lower extremities Straight Leg Raise: positive left / right Babiniski test: neg

## 2025-06-08 NOTE — DISCUSSION/SUMMARY
[de-identified] : Bilateral X-Ray Examination of the KNEE (4 views): there are no fractures, subluxations or dislocations. Medial and Patellofemoral degenerative changes.  - Patient states she had a recent fall stepping outside of her home on 4/25.   BILATERAL KNEES R>L: - The natural progression of osteoarthritis was explained to the patient. Dx is OA in bilateral knees. We discussed the possibility of treatment options from conservative to operative. These included NSAIDS, Glucosamine and Chondroitin sulfate, as well different types of injections.  We also discussed that at some point they may progress to need a TKA, not needed at this time. Information and pamphlets were given. - Discussed Playmaker brace to improve stability, dispensed in office. - Discussed it is too soon to repeat RIGHT knee CSI today. Timing and frequency of repeat CSIs reviewed.  - The risks, benefits and alternatives to corticosteroid injections were reviewed with patient.  Risks outlined include, but, are not limited to infection, sepsis, bleeding, scarring, skin discoloration, temporary increase in pain, syncopal episode, failure to resolve symptoms, symptoms recurrence, allergic reaction, flare reaction and elevation of blood sugar in diabetics. Patient understood the risk and asked to proceed with this treatment course.   Tolerated well today in LEFT KNEE. Post injection instructions. - Discussed future gel injections.  Follow Up: to start visco series  Dx / Natural History: The patient was advised of the diagnosis.  The natural history of the pathology was explained in full to the patient in layman's terms.  Several different treatment options were discussed and explained in full to the patient including the risks and benefits of both surgical and non-surgical treatments.  All questions and concerns were answered.   Pain Guide Activities: The patient was advised to let pain guide the gradual advancement of activities.  Icing: The patient was advised to apply ice (wrapped in a towel or protective covering) to the area daily (20 minutes at a time, 2-4X/day).

## 2025-06-09 NOTE — PROCEDURE
[de-identified] : Procedure: Transnasal flexible laryngoscopy   Description: Informed consent was obtained from the patient prior to the procedure. The patient was seated in the clinic chair. Topical anesthesia was achieved by first spraying the nasal cavities with 4% lidocaine and 1% phenylephrine.   Exam: Clear vallecula, crisp epiglottis. Aryepiglottic folds: intact and symmetric bilaterally Hypopharynx: No pooling Interarytenoid space: No lesions or pachydermia False vocal folds: Symmetric and without lesions or masses. False fold voicing (plica ventricularis) is not noted True vocal folds: normal and symmetric motion bilaterally. No paradoxical motion. Right medial edge is crisp and shows no lesions or masses. Left medial edge is crisp and shows no lesions or masses. Mucosal covering is minimally edematous. No erythema. No obvious vascular ectasias. Vocal processes do not demonstrate granulomas. Subglottis/proximal trachea: clear and unobstructed to the limits of the examination today. Other:

## 2025-06-09 NOTE — HISTORY OF PRESENT ILLNESS
[de-identified] : ISMAEL WALDEN is a 74 year old woman who presents to the [facility] with difficulty swallowing for the past year. Had Zenker surgery in 2018 in [place]. Reports her swallowing has gotten worse for the last year. Reports that food will get stuck and has to drink water after to get it down. Reports she may have aspirated a couple of weeks ago. Had MBS 2 months ago at [facility] (does not have report) but was told by Dr Hollis that her Zenker is mild to moderate. Reports dyspnea on exertion. No recent fevers or infections.  She is referred by Dr. Aryan Hollis. They did NOT alter their diet to avoid troublesome consistencies. They did regurgitate recently eaten foods. Only happened 1x about 2-3 weeks ago.  They do have recent weight loss. Have lost 19 lbs since March. Recently lost , does not have an appetite. They do have frequent classic reflux symptoms. Taking omeprazole.  MBS 03/08/24  Does have dyspnea that is stable for a while. Does have hoarseness. Denies fevers/chills.   Esophagram performed on 4/18/25 and reviewed by myself shows:  Small Zenker diverticulum, approx 1 cm. No gastroesophageal reflux elicited. No hiatal hernia. Evidence of some esophageal dysmotility.   Prior Pertinent Procedures: 2018 Endoscopic Zenker diverticulotomy; Dr. Alyse Oden

## 2025-06-09 NOTE — ASSESSMENT
[FreeTextEntry1] :  Assessment/Plan: #1 Dysphagia #2 Zenker Diverticulum #3 Cardiac hx  Her esophageal transit is adequate. Her lower esophageal sphincter function is adequate.  We discussed the natural history of Zenker's diverticula. These are created by cricopharyngeal dysfunction and failure to relax this muscle. Over time, a weakness in the posterior hypopharyngeal wall allows an outpouching of the mucosa that creates a sac. This sac will grow with continued swallowing and can become quite large. Initial symptoms include effortful swallowing and modest modification in diet. Over time, the sac becomes large enough that it fills with food or liquid. This may spontaneously empty and patients may note a wet voice after eating or regurgitation of recent meals.  Treatment of these lesions may be accomplished by dividing the cricopharyngeus muscle and excising the sac or opening it into the cervical esophagus. If the sac is small, it can be left alone if the muscle is divided completely. The procedure can be performed endoscopically, either with a stapler or a laser depending on the size of the sac and cervical esophageal geometry. All procedures carry the risk of esophageal perforation, dental injury, recurrent laryngeal nerve injury (with open procedure) and failure to resolve the dysphagia. Other options include observation, dilation and open cricopharyngeal myotomy with diverticulectomy or diverticulopexy without endoscopic attempt.  After a thorough discussion of our findings today, I have offered a trial of endoscopic staple- or laser-assisted Zenker's diverticulotomy and cricopharyngeal myotomy. If this fails, I could stop the procedure or proceed with open Zenker's diverticulotomy and cricopharyngeus myotomy. We discussed the recovery from the procedure, possible hospitalization if done open or with a laser, and anticipated return to unrestricted diet over 2-4 weeks.  After answering all questions to the best of my ability, Ms. WALDEN has not elected to undergo endoscopic laser-assisted Zenker's diverticulotomy and cricopharyngeal myotomy with open diverticulectomy and cricopharyngeus myotomy should we not be able to expose the cricopharyngeal bar adequately.  May follow up with me as needed.

## 2025-06-09 NOTE — PROCEDURE
[de-identified] : Procedure: Transnasal flexible laryngoscopy   Description: Informed consent was obtained from the patient prior to the procedure. The patient was seated in the clinic chair. Topical anesthesia was achieved by first spraying the nasal cavities with 4% lidocaine and 1% phenylephrine.   Exam: Clear vallecula, crisp epiglottis. Aryepiglottic folds: intact and symmetric bilaterally Hypopharynx: No pooling Interarytenoid space: No lesions or pachydermia False vocal folds: Symmetric and without lesions or masses. False fold voicing (plica ventricularis) is not noted True vocal folds: normal and symmetric motion bilaterally. No paradoxical motion. Right medial edge is crisp and shows no lesions or masses. Left medial edge is crisp and shows no lesions or masses. Mucosal covering is minimally edematous. No erythema. No obvious vascular ectasias. Vocal processes do not demonstrate granulomas. Subglottis/proximal trachea: clear and unobstructed to the limits of the examination today. Other:

## 2025-06-09 NOTE — CONSULT LETTER
[Dear  ___] : Dear  [unfilled], [Consult Letter:] : I had the pleasure of evaluating your patient, [unfilled]. [Please see my note below.] : Please see my note below. [Consult Closing:] : Thank you very much for allowing me to participate in the care of this patient.  If you have any questions, please do not hesitate to contact me. [Sincerely,] : Sincerely, [FreeTextEntry2] : Dr. Aryan Hollis [FreeTextEntry3] : Taiwo Guevara M.D. Division of Laryngology | Department of Otolaryngology  [address] [address]

## 2025-06-09 NOTE — CONSULT LETTER
[Dear  ___] : Dear  [unfilled], [Consult Letter:] : I had the pleasure of evaluating your patient, [unfilled]. [Please see my note below.] : Please see my note below. [Consult Closing:] : Thank you very much for allowing me to participate in the care of this patient.  If you have any questions, please do not hesitate to contact me. [Sincerely,] : Sincerely, [FreeTextEntry2] : Dr. Aryan Hollis [FreeTextEntry3] : Taiwo Guevara M.D. Division of Laryngology | Department of Otolaryngology  [address] [address]

## 2025-06-09 NOTE — HISTORY OF PRESENT ILLNESS
[de-identified] : ISMAEL WALDEN is a 74 year old woman who presents to the [facility] with difficulty swallowing for the past year. Had Zenker surgery in 2018 in [place]. Reports her swallowing has gotten worse for the last year. Reports that food will get stuck and has to drink water after to get it down. Reports she may have aspirated a couple of weeks ago. Had MBS 2 months ago at [facility] (does not have report) but was told by Dr Hollis that her Zenker is mild to moderate. Reports dyspnea on exertion. No recent fevers or infections.  She is referred by Dr. Aryan Hollis. They did NOT alter their diet to avoid troublesome consistencies. They did regurgitate recently eaten foods. Only happened 1x about 2-3 weeks ago.  They do have recent weight loss. Have lost 19 lbs since March. Recently lost , does not have an appetite. They do have frequent classic reflux symptoms. Taking omeprazole.  MBS 03/08/24  Does have dyspnea that is stable for a while. Does have hoarseness. Denies fevers/chills.   Esophagram performed on 4/18/25 and reviewed by myself shows:  Small Zenker diverticulum, approx 1 cm. No gastroesophageal reflux elicited. No hiatal hernia. Evidence of some esophageal dysmotility.   Prior Pertinent Procedures: 2018 Endoscopic Zenker diverticulotomy; Dr. Alyse Oden

## 2025-06-10 NOTE — IMAGING
[de-identified] : RIGHT KNEE: Inspection: mild effusion Palpation: medial joint line tenderness, anterior tenderness Knee Range of Motion: 3-125 Strength: 5/5 Quadriceps strength, 5/5 Hamstring strength Neurological: light touch is intact throughout Ligament Stability and Special Tests: McMurrays: neg Lachman: neg Pivot Shift: neg Posterior Drawer: neg Valgus: neg Varus: neg Patella Apprehension: neg Patella Maltracking: neg   LEFT KNEE: Inspection: mild effusion Palpation: medial joint line tenderness, anterior tenderness Knee Range of Motion: 3-125 Strength: 5/5 Quadriceps strength, 5/5 Hamstring strength Neurological: light touch is intact throughout Ligament Stability and Special Tests: McMurrays: neg Lachman: neg Pivot Shift: neg Posterior Drawer: neg Valgus: neg Varus: neg Patella Apprehension: neg Patella Maltracking: neg

## 2025-06-10 NOTE — DISCUSSION/SUMMARY
[de-identified] : Patient states she fell last week and 2x today. Discussed FU with neurologist.  BILATERAL KNEES R>L: - The natural progression of osteoarthritis was explained to the patient. Dx is OA in bilateral knees. We discussed the possibility of treatment options from conservative to operative. These included NSAIDS, Glucosamine and Chondroitin sulfate, as well different types of injections.  We also discussed that at some point they may progress to need a TKA, not needed at this time. Information and pamphlets were given. - B/L Visco-3 Inj #1 today, tolerated well.     Follow Up: one week for Visco-3 Inj #2  Dx / Natural History: The patient was advised of the diagnosis.  The natural history of the pathology was explained in full to the patient in layman's terms.  Several different treatment options were discussed and explained in full to the patient including the risks and benefits of both surgical and non-surgical treatments.  All questions and concerns were answered.   Pain Guide Activities: The patient was advised to let pain guide the gradual advancement of activities.  Icing: The patient was advised to apply ice (wrapped in a towel or protective covering) to the area daily (20 minutes at a time, 2-4X/day).

## 2025-06-10 NOTE — HISTORY OF PRESENT ILLNESS
[de-identified] : 06/09/2025: Patient here for bilateral knee Visco-3 injection #1.  06/02/2025: Pt here today for follow-up bilateral knees. Pain and symptoms are the same. Since last visit pt had some relief from CSI. Today c/o persistent pain, she would like to move forward w/ Visco supplementation.  Appario bilateral knees. Using Nitro walker. R>L knee swelling. Pain is progressively getting worse. L knee has posterior pain. Increased difficulty with stairs. She is doing acupuncture for her neck and back. She continues to do PT.    04/28/2025: Pt here today for follow-up bilateral knees. Pain and symptoms are worse. Since last visit pt took a fall stepping out of her house on 4/25/2025. Today c/o buckling, limited ROM and pain w/ walking, R>L.  03/10/2025 : Patient is presenting today for f/u bilateral knees. Euflexxa Injection #  3 today.   3/03/2025: Patient presents today for Euflexxa #2 for bilateral knees. Patient presents with new complaint, reports she fell onto concrete coming out of her home on 2/28/25 experiencing pain in right hand, upper and lower back and left shoulder.   02/24/2025: Pt here today for follow-up for bilateral knee pain. Pain and symptoms are worsening, patient wishes to proceed with Euflexxa series #1 for bilateral knees.  01/22/2025: Pt here today for follow-up right knee. Pain and symptoms are worse. Since last visit, pt said she had her right knee buckle today while doing laundry and has had swelling since. Today she is here to discuss whether she can get a CSI or a knee brace to help her pain.  12/09/2024: Patient here to start Visco-3 series injection #2 left knee and right thumb  12/2/24: Patient here to start Visco-3 series injection #1 left knee and right thumb. (Appario program)   10/28/24: Pt here today for follow-up for bilateral knee pain and right hand pain. Pain and symptoms are better overall. Since last visit patient received euflexxa gel series, which she had some relief for 3 months. Today c/o pain in the back of the knee and difficulty w/ knee flexion.    8/12/24: Here for Euflexxa inj #3 B/L knees  8/5/24: Here for Euflexxa inj #2 B/L knees  7/29/24: Here for Euflexxa #1 bilateral knees  7/15/24: Here for follow up of B/L knees. Continues to have pain in knees. Currently using a walker for ambulation.

## 2025-06-10 NOTE — DISCUSSION/SUMMARY
[de-identified] : Patient states she fell last week and 2x today. Discussed FU with neurologist.  BILATERAL KNEES R>L: - The natural progression of osteoarthritis was explained to the patient. Dx is OA in bilateral knees. We discussed the possibility of treatment options from conservative to operative. These included NSAIDS, Glucosamine and Chondroitin sulfate, as well different types of injections.  We also discussed that at some point they may progress to need a TKA, not needed at this time. Information and pamphlets were given. - B/L Visco-3 Inj #1 today, tolerated well.     Follow Up: one week for Visco-3 Inj #2  Dx / Natural History: The patient was advised of the diagnosis.  The natural history of the pathology was explained in full to the patient in layman's terms.  Several different treatment options were discussed and explained in full to the patient including the risks and benefits of both surgical and non-surgical treatments.  All questions and concerns were answered.   Pain Guide Activities: The patient was advised to let pain guide the gradual advancement of activities.  Icing: The patient was advised to apply ice (wrapped in a towel or protective covering) to the area daily (20 minutes at a time, 2-4X/day).

## 2025-06-10 NOTE — IMAGING
[de-identified] : RIGHT KNEE: Inspection: mild effusion Palpation: medial joint line tenderness, anterior tenderness Knee Range of Motion: 3-125 Strength: 5/5 Quadriceps strength, 5/5 Hamstring strength Neurological: light touch is intact throughout Ligament Stability and Special Tests: McMurrays: neg Lachman: neg Pivot Shift: neg Posterior Drawer: neg Valgus: neg Varus: neg Patella Apprehension: neg Patella Maltracking: neg   LEFT KNEE: Inspection: mild effusion Palpation: medial joint line tenderness, anterior tenderness Knee Range of Motion: 3-125 Strength: 5/5 Quadriceps strength, 5/5 Hamstring strength Neurological: light touch is intact throughout Ligament Stability and Special Tests: McMurrays: neg Lachman: neg Pivot Shift: neg Posterior Drawer: neg Valgus: neg Varus: neg Patella Apprehension: neg Patella Maltracking: neg

## 2025-06-10 NOTE — PROCEDURE
[FreeTextEntry3] : The risks, benefits, and alternatives to viscosupplementation injection were reviewed with the patient. Risks outlined include but are not limited to infection, sepsis, bleeding, scarring, temporary increase in pain, syncopal episode, failure to resolve symptoms, symptoms recurrence, allergic reaction, flare reaction, pseudoseptic reaction.  Patient understood the risks and asked to proceed with this treatment course.   Large joint injection was performed of BILATERAL knee. The indication for this procedure was pain, inflammation and x-ray evidence of Osteoarthritis on this or prior visit. The site was prepped with alcohol, betadine, ethyl chloride sprayed topically and sterile technique used. An injection of Visco-3, series # 1 was used.   Patient tolerated procedure well. Patient was advised to call if redness, pain or fever occur and apply ice for 15 minutes out of every hour for the next 12-24 hours as tolerated.   Patient has tried OTC's including aspirin, Ibuprofen, Aleve, etc or prescription NSAIDS, and/or exercises at home and/or physical therapy without satisfactory response, patient had decreased mobility in the joint and the risks benefits, and alternatives have been discussed, and verbal consent was obtained.   Visualization of the needle and placement of injection was performed without complication.

## 2025-06-10 NOTE — HISTORY OF PRESENT ILLNESS
[de-identified] : 06/09/2025: Patient here for bilateral knee Visco-3 injection #1.  06/02/2025: Pt here today for follow-up bilateral knees. Pain and symptoms are the same. Since last visit pt had some relief from CSI. Today c/o persistent pain, she would like to move forward w/ Visco supplementation.  Appario bilateral knees. Using Nitro walker. R>L knee swelling. Pain is progressively getting worse. L knee has posterior pain. Increased difficulty with stairs. She is doing acupuncture for her neck and back. She continues to do PT.    04/28/2025: Pt here today for follow-up bilateral knees. Pain and symptoms are worse. Since last visit pt took a fall stepping out of her house on 4/25/2025. Today c/o buckling, limited ROM and pain w/ walking, R>L.  03/10/2025 : Patient is presenting today for f/u bilateral knees. Euflexxa Injection #  3 today.   3/03/2025: Patient presents today for Euflexxa #2 for bilateral knees. Patient presents with new complaint, reports she fell onto concrete coming out of her home on 2/28/25 experiencing pain in right hand, upper and lower back and left shoulder.   02/24/2025: Pt here today for follow-up for bilateral knee pain. Pain and symptoms are worsening, patient wishes to proceed with Euflexxa series #1 for bilateral knees.  01/22/2025: Pt here today for follow-up right knee. Pain and symptoms are worse. Since last visit, pt said she had her right knee buckle today while doing laundry and has had swelling since. Today she is here to discuss whether she can get a CSI or a knee brace to help her pain.  12/09/2024: Patient here to start Visco-3 series injection #2 left knee and right thumb  12/2/24: Patient here to start Visco-3 series injection #1 left knee and right thumb. (Appario program)   10/28/24: Pt here today for follow-up for bilateral knee pain and right hand pain. Pain and symptoms are better overall. Since last visit patient received euflexxa gel series, which she had some relief for 3 months. Today c/o pain in the back of the knee and difficulty w/ knee flexion.    8/12/24: Here for Euflexxa inj #3 B/L knees  8/5/24: Here for Euflexxa inj #2 B/L knees  7/29/24: Here for Euflexxa #1 bilateral knees  7/15/24: Here for follow up of B/L knees. Continues to have pain in knees. Currently using a walker for ambulation.

## 2025-06-11 NOTE — PROCEDURE
[FreeTextEntry3] : Date of Service: 06/11/2025   Account: [id number]  Patient: ISMAEL WALDEN   YOB: 1950  Age: 74 year  Surgeon:                  Alverto Barney DO  Assistant:                None  Pre-Operative Diagnosis:   Lumbar Spondylosis      Post Operative Diagnosis:  Lumbar Spondylosis  Procedure:             Bilateral L4-5, L5-S1 facet block under fluoroscopic guidance.  Anesthesia:            MAC  This procedure was carried out using fluoroscopic guidance.  The risks and benefits of the procedure were discussed extensively with the patient.  The consent of the patient was obtained, and the following procedure was performed. The patient was placed in the prone position on the fluoroscopy table and the area was prepped and draped in a sterile fashion.  A timeout was performed with all essential staff present and the site and side were verified.  The lumbar vertebral bodies were identified, and the fluoroscope was obliqued ipsilateral to approximately 30 degrees to reveal the appropriate anatomical view.  The junction of the superior articulate process and transverse process at the right L4 and L5 levels were identified and marked.   The skin at these target points was then localized using 1 cc of 1% Lidocaine at each injection site.  A spinal needle was then introduced and advanced to the above target points at the junction of the SAP and transverse processes until bone was contacted.  Fluoroscope then focused on the right sacral ala on A/P view and marked at this point.  The skin and subcutaneous structures were localized using 1cc of 1.0 % lidocaine.  A spinal needle was then advanced under fluoroscopic guidance until bone was contacted at the ala.    After negative aspiration for heme and CSF 1 cc of 0.5% Marcaine was injected at each of the injection sites.  The procedure was performed in the exact same fashion on the contralateral left side at the L4, L5 and sacral ala levels.  Vital signs remained normal throughout the procedure.  The patient tolerated the procedure well.  There were no immediate complications from the performed procedure.  The patient was instructed to apply ice over the injection sites for twenty minutes every two hours for the next 24 hours.  Disposition:      1. The patient was advised to F/U in 1-2 weeks to assess the response to the injection.       2. They were advised to keep a pain diary to report the results of the diagnostic block at their FUV.      3. The patient was also instructed to contact me immediately if there were any concerns related to the procedure performed.

## 2025-06-13 NOTE — PROCEDURE
[de-identified] : Acupuncture treatment: Baldry technique with multiple needle placement to the cervical paraspinal, lumbar paraspinal and gluteal musculature with UV lamp x 45 minutes Paralumbar chain (bladder meridian) with ES x 45 minutes K3-HT3 SI4-BL60 with ES x 45 minutes BL 10, SP 6, ST 36, LR 3, LI 4, BL 59, omega 2, Cooper men Patient tolerated procedure well

## 2025-06-18 NOTE — IMAGING
[de-identified] : RIGHT KNEE: Inspection: mild effusion Palpation: medial joint line tenderness, anterior tenderness Knee Range of Motion: 3-125 Strength: 5/5 Quadriceps strength, 5/5 Hamstring strength Neurological: light touch is intact throughout Ligament Stability and Special Tests: McMurrays: neg Lachman: neg Pivot Shift: neg Posterior Drawer: neg Valgus: neg Varus: neg Patella Apprehension: neg Patella Maltracking: neg   LEFT KNEE: Inspection: mild effusion Palpation: medial joint line tenderness, anterior tenderness Knee Range of Motion: 3-125 Strength: 5/5 Quadriceps strength, 5/5 Hamstring strength Neurological: light touch is intact throughout Ligament Stability and Special Tests: McMurrays: neg Lachman: neg Pivot Shift: neg Posterior Drawer: neg Valgus: neg Varus: neg Patella Apprehension: neg Patella Maltracking: neg

## 2025-06-18 NOTE — DISCUSSION/SUMMARY
[de-identified] : BILATERAL KNEES R>L: - The natural progression of osteoarthritis was explained to the patient. Dx is OA in bilateral knees. We discussed the possibility of treatment options from conservative to operative. These included NSAIDS, Glucosamine and Chondroitin sulfate, as well different types of injections.  We also discussed that at some point they may progress to need a TKA, not needed at this time. Information and pamphlets were given. - B/L Visco-3 Inj #2 today, tolerated well.     Follow Up: one week for Visco-3 Inj #3  Dx / Natural History: The patient was advised of the diagnosis.  The natural history of the pathology was explained in full to the patient in layman's terms.  Several different treatment options were discussed and explained in full to the patient including the risks and benefits of both surgical and non-surgical treatments.  All questions and concerns were answered.   Pain Guide Activities: The patient was advised to let pain guide the gradual advancement of activities.  Icing: The patient was advised to apply ice (wrapped in a towel or protective covering) to the area daily (20 minutes at a time, 2-4X/day).

## 2025-06-18 NOTE — DISCUSSION/SUMMARY
[de-identified] : BILATERAL KNEES R>L: - The natural progression of osteoarthritis was explained to the patient. Dx is OA in bilateral knees. We discussed the possibility of treatment options from conservative to operative. These included NSAIDS, Glucosamine and Chondroitin sulfate, as well different types of injections.  We also discussed that at some point they may progress to need a TKA, not needed at this time. Information and pamphlets were given. - B/L Visco-3 Inj #2 today, tolerated well.     Follow Up: one week for Visco-3 Inj #3  Dx / Natural History: The patient was advised of the diagnosis.  The natural history of the pathology was explained in full to the patient in layman's terms.  Several different treatment options were discussed and explained in full to the patient including the risks and benefits of both surgical and non-surgical treatments.  All questions and concerns were answered.   Pain Guide Activities: The patient was advised to let pain guide the gradual advancement of activities.  Icing: The patient was advised to apply ice (wrapped in a towel or protective covering) to the area daily (20 minutes at a time, 2-4X/day).

## 2025-06-18 NOTE — PROCEDURE
[FreeTextEntry3] : The risks, benefits, and alternatives to viscosupplementation injection were reviewed with the patient. Risks outlined include but are not limited to infection, sepsis, bleeding, scarring, temporary increase in pain, syncopal episode, failure to resolve symptoms, symptoms recurrence, allergic reaction, flare reaction, pseudoseptic reaction.  Patient understood the risks and asked to proceed with this treatment course.   Large joint injection was performed of BILATERAL knee. The indication for this procedure was pain, inflammation and x-ray evidence of Osteoarthritis on this or prior visit. The site was prepped with alcohol, betadine, ethyl chloride sprayed topically and sterile technique used. An injection of Visco-3, series # 2 was used.   Patient tolerated procedure well. Patient was advised to call if redness, pain or fever occur and apply ice for 15 minutes out of every hour for the next 12-24 hours as tolerated.   Patient has tried OTC's including aspirin, Ibuprofen, Aleve, etc or prescription NSAIDS, and/or exercises at home and/or physical therapy without satisfactory response, patient had decreased mobility in the joint and the risks benefits, and alternatives have been discussed, and verbal consent was obtained.   Visualization of the needle and placement of injection was performed without complication.

## 2025-06-18 NOTE — IMAGING
[de-identified] : RIGHT KNEE: Inspection: mild effusion Palpation: medial joint line tenderness, anterior tenderness Knee Range of Motion: 3-125 Strength: 5/5 Quadriceps strength, 5/5 Hamstring strength Neurological: light touch is intact throughout Ligament Stability and Special Tests: McMurrays: neg Lachman: neg Pivot Shift: neg Posterior Drawer: neg Valgus: neg Varus: neg Patella Apprehension: neg Patella Maltracking: neg   LEFT KNEE: Inspection: mild effusion Palpation: medial joint line tenderness, anterior tenderness Knee Range of Motion: 3-125 Strength: 5/5 Quadriceps strength, 5/5 Hamstring strength Neurological: light touch is intact throughout Ligament Stability and Special Tests: McMurrays: neg Lachman: neg Pivot Shift: neg Posterior Drawer: neg Valgus: neg Varus: neg Patella Apprehension: neg Patella Maltracking: neg

## 2025-06-18 NOTE — HISTORY OF PRESENT ILLNESS
[de-identified] : 06/16/2025: Patient here for bilateral knee Visco-3 injection #2.  06/09/2025: Patient here for bilateral knee Visco-3 injection #1.  06/02/2025: Pt here today for follow-up bilateral knees. Pain and symptoms are the same. Since last visit pt had some relief from CSI. Today c/o persistent pain, she would like to move forward w/ Visco supplementation.  Appario bilateral knees. Using Nitro walker. R>L knee swelling. Pain is progressively getting worse. L knee has posterior pain. Increased difficulty with stairs. She is doing acupuncture for her neck and back. She continues to do PT.    04/28/2025: Pt here today for follow-up bilateral knees. Pain and symptoms are worse. Since last visit pt took a fall stepping out of her house on 4/25/2025. Today c/o buckling, limited ROM and pain w/ walking, R>L.  03/10/2025 : Patient is presenting today for f/u bilateral knees. Euflexxa Injection #  3 today.   3/03/2025: Patient presents today for Euflexxa #2 for bilateral knees. Patient presents with new complaint, reports she fell onto concrete coming out of her home on 2/28/25 experiencing pain in right hand, upper and lower back and left shoulder.   02/24/2025: Pt here today for follow-up for bilateral knee pain. Pain and symptoms are worsening, patient wishes to proceed with Euflexxa series #1 for bilateral knees.  01/22/2025: Pt here today for follow-up right knee. Pain and symptoms are worse. Since last visit, pt said she had her right knee buckle today while doing laundry and has had swelling since. Today she is here to discuss whether she can get a CSI or a knee brace to help her pain.  12/09/2024: Patient here to start Visco-3 series injection #2 left knee and right thumb  12/2/24: Patient here to start Visco-3 series injection #1 left knee and right thumb. (Appario program)   10/28/24: Pt here today for follow-up for bilateral knee pain and right hand pain. Pain and symptoms are better overall. Since last visit patient received euflexxa gel series, which she had some relief for 3 months. Today c/o pain in the back of the knee and difficulty w/ knee flexion.    8/12/24: Here for Euflexxa inj #3 B/L knees  8/5/24: Here for Euflexxa inj #2 B/L knees  7/29/24: Here for Euflexxa #1 bilateral knees  7/15/24: Here for follow up of B/L knees. Continues to have pain in knees. Currently using a walker for ambulation.

## 2025-06-18 NOTE — HISTORY OF PRESENT ILLNESS
[de-identified] : 06/16/2025: Patient here for bilateral knee Visco-3 injection #2.  06/09/2025: Patient here for bilateral knee Visco-3 injection #1.  06/02/2025: Pt here today for follow-up bilateral knees. Pain and symptoms are the same. Since last visit pt had some relief from CSI. Today c/o persistent pain, she would like to move forward w/ Visco supplementation.  Appario bilateral knees. Using Nitro walker. R>L knee swelling. Pain is progressively getting worse. L knee has posterior pain. Increased difficulty with stairs. She is doing acupuncture for her neck and back. She continues to do PT.    04/28/2025: Pt here today for follow-up bilateral knees. Pain and symptoms are worse. Since last visit pt took a fall stepping out of her house on 4/25/2025. Today c/o buckling, limited ROM and pain w/ walking, R>L.  03/10/2025 : Patient is presenting today for f/u bilateral knees. Euflexxa Injection #  3 today.   3/03/2025: Patient presents today for Euflexxa #2 for bilateral knees. Patient presents with new complaint, reports she fell onto concrete coming out of her home on 2/28/25 experiencing pain in right hand, upper and lower back and left shoulder.   02/24/2025: Pt here today for follow-up for bilateral knee pain. Pain and symptoms are worsening, patient wishes to proceed with Euflexxa series #1 for bilateral knees.  01/22/2025: Pt here today for follow-up right knee. Pain and symptoms are worse. Since last visit, pt said she had her right knee buckle today while doing laundry and has had swelling since. Today she is here to discuss whether she can get a CSI or a knee brace to help her pain.  12/09/2024: Patient here to start Visco-3 series injection #2 left knee and right thumb  12/2/24: Patient here to start Visco-3 series injection #1 left knee and right thumb. (Appario program)   10/28/24: Pt here today for follow-up for bilateral knee pain and right hand pain. Pain and symptoms are better overall. Since last visit patient received euflexxa gel series, which she had some relief for 3 months. Today c/o pain in the back of the knee and difficulty w/ knee flexion.    8/12/24: Here for Euflexxa inj #3 B/L knees  8/5/24: Here for Euflexxa inj #2 B/L knees  7/29/24: Here for Euflexxa #1 bilateral knees  7/15/24: Here for follow up of B/L knees. Continues to have pain in knees. Currently using a walker for ambulation.

## 2025-06-20 NOTE — HISTORY OF PRESENT ILLNESS
[Lower back] : lower back [10] : 10 [Burning] : burning [Throbbing] : throbbing [Tightness] : tightness [Constant] : constant [Leisure] : leisure [Social interactions] : social interactions [Heat] : heat [Standing] : standing [Walking] : walking [Lying in bed] : lying in bed [FreeTextEntry1] : 2025 - Patient presents today for a FUV after bilateral L4-L5, L5-S1 facet joint MBB on 2025. Patient reports >50% relief of axial low back pain the day of the procedure with a gradual return of pain to baseline the following day.  Patient had a positive response to the nerve block and wishes to proceed with a repeat at this time.  Denies any alarm signs or changes in medical history since last visit.  Patient continues acupuncture regularly and reports neck TPIs yesterday which have not provided relief of her headaches as of yet. She is considering a retrial of Botox for management in the near future.   6/3/2025 - Patient presents for FUV after a C7-T1 DARRELL on 2025. Patient denies any meaningful relief following the procedure. Her symptoms remain stable since her prior visit; she continues to have neck pain traveling to the back of her head.  She denies any radiation of pain into the upper extremities.  She also continues to have axial low back pain. She reports difficulty sleeping due to pain. She continues to follow up with neurology for her neck pain and headaches.  Had neurology consultation with Dr. Mas.  She started taking Trazadone per her Internist without significant benefit.  2025 - Patient presents for 4-week FUV.  Patient reports she continues to have low back pain. However, she reports her neck pain is now her predominant concern. She has pain in her neck traveling up to the back of her head and bilateral traps, associated with headaches. She has tried Botox with an outside neurologist, without benefit. She continues PT and acupuncture. Patient reports she followed up with Dr. Kimball who indicated she is a surgical candidate; however, she defers surgical intervention at this time. Of note, patient is following up with Dr. Nguyen for right knee pain; she reports she fell a few days ago onto her knee. She is taking Meloxicam PRN, Cymbalta daily, and Aspirin 81 mg prophylactically.  2025 - Patient presents for FUV after a L5-S1 LESI on 3/7/2025.  Patient reports she can walk more easily after the injection. She continues to have some residual pain in her low back and right hip. She denies any groin pain. Her low back pain is her predominant concern and is exacerbated with any activity. She is still under the care of several subspecialists for various conditions, including neurology for chronic headaches. She tried Botox therapy as previously advised. She is planning to establish care with Dr. Jigar rivera for evaluation of her lumbar spine.  2025 - Patient presents for 8-week FUV. Patient reports multifocal pain once again.  Greatest concern today is in regard to her low back with radiation to the right lower extremity.  Feels that it is still currently manageable with a combination of physical therapy and acupuncture.  She is currently under the care of several subspecialist for various conditions including a neurologist for her chronic headaches.  Botox therapy was suggested, and she is contemplating this.  12/3/2024 - Patient presents for FUV after a L5-S1 LESI on 11/15/2024. Patient reports 65% relief of symptoms and improvement in functionality.  She reports she has not required medication since the procedure and is pleased with relief in her low back pain. She has been receiving acupuncture as well and finds that has provided significant relief to her low back pain.    Today however, her most bothersome area of pain today is her cervical pain and headaches.  She is currently undergoing treatment for vitamin B12 and vitamin D deficiencies.  She is interested in scheduling a repeat DARRELL with the hope of decreasing her pain and increasing her functionality and quality of life.   10/25/2024 - Patient presents for a FUV after a bilateral occipital nerve block with U/S guidance on 2024. Patient reports mild improvement in her headaches at this time however, she has been addressing her cervical spine and finds that has been contributing to her relief.  She was seen by neurology who determined the HAs were generating from her neck and she now has been in physical therapy 3 times/week for the past 14 weeks and will be initiating acupuncture in the next few weeks as well. Of note, patient established care with a new cardiologist and is now on a new medication regimen and reports better controlled hypertension.  Today. her most bothersome symptom is her low back pain with radiation to bilateral lower extremities. She reports burning, numbness and tingling to bilateral feet. Presents today to discuss next steps in her treatment plan for her low back pain with the hope of decreasing her pain and increasing her functionality and quality of life.   2024 - Patient presents for FUV after a bilateral occipital nerve block on 2024. Patient reports a significant reduction in the frequency and intensity of her headaches for about 3-4 days s/p occipital block.  Unfortunately, she experienced a return of symptoms essentially to baseline.  She does continue to have headaches from which she thinks could possibly related to uncontrolled hypertension.  She has been seeing cardiology and will follow up with neurology.  2024 - Patient presents for FUV after a L5-S1 LESI on 2024. Patient reports moderate reduction of her lower back and right leg pain following the epidural.  Unfortunately, patient was seen in the ER 3 days post procedure for what she describes as severe hypertension and ongoing headache.  She was evaluated and discharged home.  Patient has been dealing with headaches for quite some time now without definitive source.  Has seen her longtime neurologist Dr. Kellerman and recently had an evaluation with Dr. Salmon ([facility]) who ordered repeat imaging of the C/T/L spine as well as brain.  Headache severity is greatest at the bilateral occiput with radiation to the top of her head.  2024 - Patient presents for FUV regarding their lower back pain.  She was last seen approximately 6 months ago.  Patient reports sustained relief following her previous lumbar epidural steroid injection which lasted until approximately 1 month ago.  Now has a return of lower back with radiation down the posterior right leg stopping at the posterior calf.  Additionally, she reports a flareup of her peripheral neuropathy as per her treating neurologist.  A course of intravenous corticosteroid was suggested and completed about 5 weeks ago.  Takes Cymbalta 60 mg and Tylenol daily with no benefit; she has discontinued Lyrica due to no perceived benefit.  Of note she is undergoing further neurological workup and will be undergoing a spinal tap this week.  2023 - Patient presents for FUV after a L5-S1 LESI on 2023. Patient reports a complete resolution of pain s/p injection, she is pleased with the results of the epidural; she continues to take 100 mg of Lyrica BID.   10/26/2023 - Patient presents for FUV regarding their lower back pain.   Patient reports radiation to the right lower extremity extending to the foot.  Currently has titrated to a dose of Lyrica 200 mg daily which has reduced some of the "neuropathy sensations". She also complains of additional multifocal joint pain; greatest of which is in her right knee.  She sees Dr. Nguyen for this.  2023- Patient presents for FUV regarding their lower back pain. Patient had started taking Tramadol and felt sick with nausea and sweating days after using, she has since discontinued use; she no longer gets IV steroid infusions as well.  Still with complaints in the low back with radiation to the right lower extremity extending to the ankle/foot.  Worst pain is concentrated in the right lateral aspect of the calf. Greatest degree of difficulty with ambulation.  2023 - Patient presents for FUV regarding their lower back pain.  Patient did not start the [place]  financial constraints.  She is following with a neurologist for IV steroid infusions for tx of peripheral neuropathy.  Patient reports she gets right sided arm numbness/tingling and right foot paranesthesia's.  She is becoming frustrated by her multitude of pain issues.  She is dealing with concurrent neck, low back, knee, and neurological issues.  2023 - Patient presents to review lumbar spine MRI from 2023. The majority of the patient's pain is in the right foot, and occasionally the first digit on the left foot, there is no pain in the rest of the lower extremities, she has back pain, but it is not as prominent as the right foot pain.  Has seen neurology in the past and obtained EMG.  Planning for follow-up in upcoming week.  2023 - Patient presents for FUV after a L5-S1 LESI on 2023.  Patient reports overall 50% relief sustained following the injection.  Patient reports her pain returned to baseline after about 2 weeks. She complains of primarily axial lower back pain specifically with prolonged standing.  2023 - Patient presents for FUV after a right PM C7-T1 DARRELL on 2023. Patient reports significant pain reduction following the injection (60% reduction in pain).  She is pleased with her response.  Patient's main complaint today is her axial lower back pain that has radiation to the medial right thigh, she had taken a trip to [place] recently for a week where she did extensive walking and exacerbated her back pain; she can endure 5-10 minutes of standing before it becomes unbearable.  Has had previous neurosurgical evaluations and surgery was recommended however she defers.  2023 - Patient presents for reevaluation.  She was last seen approximately 6 months ago.  Patient to the office regarding her neck pain. Patient was referred by Dr. Arevalo. Patient was originally seen for her back in September. Patient was placed on MDP for her right upper extremity radicular pain with good relief. Patient complains of right rotation stiffness in regards to her neck. Patient here for possible interventional management of cervical region ridiculous.  Taking dual action Advil once sometimes twice daily.  2022 - The patient presents for initial evaluation regarding their low back pain. Patient was referred by Dr. Nguyen. Patient sustained a slip and fall in 2021 and landed on her buttocks. Significant flare-up in her lower back since fall.  Experiences numbness in her toes and fingertips get tingling sensation when she closes her fists. Patient has gotten trigger point injections with no relief. Leg symptoms improve when seated. Diclofenac provides relief, previously tried Celebrex and Gabapentin - no relief.   Injections: 1) C7-T1 DARRELL (2023, 2025) 2) L5-S1 LESI (prior DOS, 2024, 11/15/2024, 3/7/2025) 3) Bilateral occipital nerve block with U/S guidance - (2024, 2024)  4) Bilateral L4-L5, L5-S1 facet joint MBB (2025)  Pertinent Surgical History: N/A   Imagin) MRI Lumbar Spine (2024) - [facility]  2) MRI Thoracic Spine (2024) - [facility]  3) MRI Cervical Spine (2024)- [facility]  Electrodiagnostic Testin) Lower Extremity NCV/EMG - (2022) - [facility]  1. 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 left peroneal motor and the right peroneal motor nerves showed reduced amplitude and decreased conduction velocity. The left tibial motor nerve showed decreased conduction velocity. The right tibial motor nerve showed reduced amplitude. The left sural sensory and right sural sensory nerves were unremarkable.  3) Upper Extremity NCV/EMG - (22) - [facility]  1. very mild bilateral median nerve lesions at the wrists 2. very mild bilateral ulnar nerve lesion at the elbows.   Physician Disclaimer: I have personally reviewed and confirmed all HPI data with the patient.   [] : no [FreeTextEntry7] : b/l knees  [FreeTextEntry9] : acupuncture   [de-identified] : rolling over in bed

## 2025-06-20 NOTE — ASSESSMENT
[FreeTextEntry1] : A discussion regarding available pain management treatment options occurred with the patient. These included interventional, rehabilitative, pharmacological, and alternative modalities. We will proceed with the following:  Interventional treatment options: - Proceed with repeat bilateral L4-L5, L5-S1 facet joint MBB with fluoroscopic guidance; proceed to RFA if adequate relief - Would likely proceed with repeat right PM L5-S1 LESI (80 mg Kenalog) with return of severe LE radicular pain - Patient has been counseled on several occasions regarding limitations of corticosteroid administration; we have suggested that she reserve injection therapies for episodes of severe pain exacerbations - she verbalizes understanding of this - Previously discussed realistic expectations of interventional pain modalities in the setting of overall degenerative spinal pathology - see additional instructions below  Rehabilitative options: - Completed extensive physical therapy for the lumbar and cervical spine with variable relief - encouraged participation in active HEP as tolerated  Medication based treatment options: - continue Tylenol 500-1000 mg up to TID on PRN basis - Continue meloxicam 7.5-15 mg daily as needed; counseled patient to discuss consistent NSAID use with her cardiologist - Continue Cymbalta 90 mg daily, prescribed by neurology - Failed previous trials of Lyrica and gabapentin - see additional instructions below  Complementary treatment options: - Weight management and lifestyle modifications discussed - Continue acupuncture therapy as directed - Dr. Valentino Stevenson  Behavioral pain treatment options: - Patient has been advised to pursue psychology evaluation; referral provided at prior visit - she has deferred to this point - Patient was counseled regarding alternative coping strategies to deal with her chronic multifocal pain  Additional treatment recommendations as follows: - Follow-up with orthopedic surgical subspecialists as directed - Patient was counseled as to red flag signs and when to seek urgent care - Follow-up with neurology (Dr. Mas) as directed regarding chronic headaches - Follow up 1-2 weeks post injection for assessment of efficacy and further treatment recommendations  We have discussed the risks, benefits, and alternatives NSAID therapy including but not limited to the risk of bleeding, thrombosis, gastric mucosal irritation/ulceration, allergic reaction and kidney dysfunction; the patient verbalizes an understanding.  The risks, benefits and alternatives of the proposed procedure were explained in detail with the patient. The risks outlined include but are not limited to infection, bleeding, post- dural puncture headache, nerve injury, a temporary increase in pain, failure to resolve symptoms, need for future interventions, allergic reaction, and possible elevation of blood sugar in diabetics if using corticosteroid.  All questions were answered to patient's apparent satisfaction, and he/she verbalized an understanding.  Patient presents with axial lumbar pain that has not responded to 3 months of conservative therapy including physical therapy or NSAID therapy.  The pain is interfering with activities of daily living and functionality.  There is no radicular pain.  The pain is exacerbated by facet loading.  Positive Kemps maneuver which is defined by pain reproduction with extension and rotation of the lumbar spine to the affected side.  The patient has not had a vertebral fusion at the levels of the proposed treatment.  There is no unexplained neurologic deficit.  There is no history of systemic infection, unstable medical condition, bleeding tendency, or local infection.  The injection is being performed to diagnose the facet joint as the source of the individual's pain, in preparation for a radiofrequency ablation.

## 2025-06-23 NOTE — DISCUSSION/SUMMARY
[de-identified] : Assessment: The patient is a 74 year old female with physical exam findings consistent with BILATERAL KNEE OA.    Plan: - We discussed their diagnosis and treatment options at length including the risks and benefits of both surgical and non-surgical options. - We will continue conservative treatment with activity modification, icing, weight loss, and anti-inflammatory medications. - The risks, benefits, and alternatives to Visco supplementation injection were reviewed with the patient. Risks outlined include but are not limited to infection, sepsis, bleeding, scarring, temporary increase in pain, syncopal episode, failure to resolve symptoms, symptoms recurrence, allergic reaction, flare reaction, pseudoseptic reaction. Patient understood the risks and asked to proceed with this treatment course. - Patient received Visco-3 injection # 3 today to the BILATERAL knee, tolerated procedure well. - The patient was advised to apply ice (wrapped in a towel or protective covering) to the area daily - 20 minutes at a time, 2-4x/day. - The patient was advised to let pain guide the gradual advancement of activities.     Follow up: 3 months after September 10   Instructions: Dx / Natural History The patient was advised of the diagnosis.  The natural history of the pathology was explained in full to the patient in layman's terms.  Several different treatment options were discussed and explained in full to the patient including the risks and benefits of both surgical and non-surgical treatments.  All questions and concerns were answered.   RICE I explained to the patient that rest, ice, compression, and elevation would benefit them.  They may return to activity after follow-up or when they no longer have any pain.   NSAIDs - OTC Patient is to begin over the counter oral anti-inflammatory medications on an as needed basis, as long as there are no medical contraindications.  Patient is counseled on possible GI and blood pressure side effects.   Pain Guide Activities The patient was advised to let pain guide the gradual advancement of activities.   Icing The patient was advised to apply ice (wrapped in a towel or protective covering) to the area daily (20 minutes at a time, 2-4X/day).   All of the patient's questions were answered to Her satisfaction. Diagnoses and potential treatments were reviewed. She agreed with the plan and would like to move forward with it.

## 2025-06-23 NOTE — PROCEDURE
[de-identified] : Acupuncture treatment: Baldry technique with multiple needle placement to the cervical paraspinal, lumbar paraspinal and gluteal musculature with UV lamp x 45 minutes Paralumbar chain (bladder meridian) with ES x 45 minutes K3-HT3 SI4-BL60 with ES x 45 minutes BL 10, SP 6, ST 36, LR 3, LI 4, BL 59, omega 2, Cooper men Patient tolerated procedure well

## 2025-06-23 NOTE — PROCEDURE
[FreeTextEntry3] : The risks, benefits, and alternatives to viscosupplementation injection were reviewed with the patient. Risks outlined include but are not limited to infection, sepsis, bleeding, scarring, temporary increase in pain, syncopal episode, failure to resolve symptoms, symptoms recurrence, allergic reaction, flare reaction, pseudoseptic reaction.  Patient understood the risks and asked to proceed with this treatment course.   Large joint injection was performed of BILATERAL knee. The indication for this procedure was pain, inflammation and x-ray evidence of Osteoarthritis on this or prior visit. The site was prepped with alcohol, betadine, ethyl chloride sprayed topically and sterile technique used. An injection of Visco-3, series # 3 was used.   Patient tolerated procedure well. Patient was advised to call if redness, pain or fever occur and apply ice for 15 minutes out of every hour for the next 12-24 hours as tolerated.   Patient has tried OTC's including aspirin, Ibuprofen, Aleve, etc or prescription NSAIDS, and/or exercises at home and/or physical therapy without satisfactory response, patient had decreased mobility in the joint and the risks benefits, and alternatives have been discussed, and verbal consent was obtained.   Visualization of the needle and placement of injection was performed without complication.

## 2025-06-23 NOTE — HISTORY OF PRESENT ILLNESS
[de-identified] : 06/23/2025 : Patient is presenting today for f/u bilateral knees. Visco-3 Injection # 3 today. Patient denies any numbness/tingling/fevers/chills.  06/16/2025: Patient here for bilateral knee Visco-3 injection #2.  06/09/2025: Patient here for bilateral knee Visco-3 injection #1.  06/02/2025: Pt here today for follow-up bilateral knees. Pain and symptoms are the same. Since last visit pt had some relief from CSI. Today c/o persistent pain, she would like to move forward w/ Visco supplementation.  Appario bilateral knees. Using Nitro walker. R>L knee swelling. Pain is progressively getting worse. L knee has posterior pain. Increased difficulty with stairs. She is doing acupuncture for her neck and back. She continues to do PT.    04/28/2025: Pt here today for follow-up bilateral knees. Pain and symptoms are worse. Since last visit pt took a fall stepping out of her house on 4/25/2025. Today c/o buckling, limited ROM and pain w/ walking, R>L.  03/10/2025 : Patient is presenting today for f/u bilateral knees. Euflexxa Injection #  3 today.   3/03/2025: Patient presents today for Euflexxa #2 for bilateral knees. Patient presents with new complaint, reports she fell onto concrete coming out of her home on 2/28/25 experiencing pain in right hand, upper and lower back and left shoulder.   02/24/2025: Pt here today for follow-up for bilateral knee pain. Pain and symptoms are worsening, patient wishes to proceed with Euflexxa series #1 for bilateral knees.  01/22/2025: Pt here today for follow-up right knee. Pain and symptoms are worse. Since last visit, pt said she had her right knee buckle today while doing laundry and has had swelling since. Today she is here to discuss whether she can get a CSI or a knee brace to help her pain.  12/09/2024: Patient here to start Visco-3 series injection #2 left knee and right thumb  12/2/24: Patient here to start Visco-3 series injection #1 left knee and right thumb. (Appario program)   10/28/24: Pt here today for follow-up for bilateral knee pain and right hand pain. Pain and symptoms are better overall. Since last visit patient received euflexxa gel series, which she had some relief for 3 months. Today c/o pain in the back of the knee and difficulty w/ knee flexion.    8/12/24: Here for Euflexxa inj #3 B/L knees  8/5/24: Here for Euflexxa inj #2 B/L knees  7/29/24: Here for Euflexxa #1 bilateral knees  7/15/24: Here for follow up of B/L knees. Continues to have pain in knees. Currently using a walker for ambulation.   show

## 2025-06-26 NOTE — PROCEDURE
[de-identified] : Acupuncture treatment: Baldry technique with multiple needle placement to the cervical paraspinal, lumbar paraspinal and gluteal musculature with UV lamp x 45 minutes Paralumbar chain (bladder meridian) with ES x 45 minutes K3-HT3 SI4-BL60 with ES x 45 minutes BL 10, SP 6, ST 36, LR 3, LI 4, BL 59, omega 2, Cooper men Patient tolerated procedure well

## 2025-07-01 NOTE — PROCEDURE
[de-identified] : Acupuncture treatment: Baldry technique with multiple needle placement to the cervical paraspinal, lumbar paraspinal and gluteal musculature with UV lamp x 45 minutes Paralumbar chain (bladder meridian) with ES x 45 minutes K3-HT3 SI4-BL60 with ES x 45 minutes BL 10, SP 6, ST 36, LR 3, LI 4, BL 59, omega 2, Cooper men Patient tolerated procedure well

## 2025-07-02 NOTE — PROCEDURE
[FreeTextEntry3] : Date of Service: 07/02/2025   Account: [id number]  Patient: ISMAEL WALDEN   YOB: 1950  Age: 74 year  Surgeon:                  Alverto Barney DO  Assistant:                None  Pre-Operative Diagnosis:   Lumbar Spondylosis      Post Operative Diagnosis:  Lumbar Spondylosis  Procedure:             Bilateral L4-5, L5-S1 facet block under fluoroscopic guidance.  Anesthesia:            MAC  This procedure was carried out using fluoroscopic guidance.  The risks and benefits of the procedure were discussed extensively with the patient.  The consent of the patient was obtained, and the following procedure was performed.  A timeout was performed with all essential staff present and the site and side were verified.  The lumbar vertebral bodies were identified, and the fluoroscope was obliqued ipsilateral to approximately 30 degrees to reveal the appropriate anatomical view.  The junction of the superior articulate process and transverse process at the right L4 and L5 levels were identified and marked.   The skin at these target points was then localized using 1 cc of 1% Lidocaine at each injection site.  A spinal needle was then introduced and advanced to the above target points at the junction of the SAP and transverse processes until bone was contacted.  Fluoroscope then focused on the right sacral ala on A/P view and marked at this point.  The skin and subcutaneous structures were localized using 1cc of 1.0 % lidocaine.  A spinal needle was then advanced under fluoroscopic guidance until bone was contacted at the ala.    After negative aspiration for heme and CSF 1 cc of 0.5% Marcaine was injected at each of the injection sites.  The procedure was performed in the exact same fashion on the contralateral left side at the L4, L5 and sacral ala levels.  Vital signs remained normal throughout the procedure.  The patient tolerated the procedure well.  There were no immediate complications from the performed procedure.  The patient was instructed to apply ice over the injection sites for twenty minutes every two hours for the next 24 hours.  Disposition:      1. The patient was advised to F/U in 1-2 weeks to assess the response to the injection.       2. They were advised to keep a pain diary to report the results of the diagnostic block at their FUV.      3. The patient was also instructed to contact me immediately if there were any concerns related to the procedure performed.

## 2025-07-09 NOTE — PROCEDURE
[de-identified] : Acupuncture treatment: Baldry technique with multiple needle placement to the cervical paraspinal, lumbar paraspinal and gluteal musculature with UV lamp x 45 minutes Paralumbar chain (bladder meridian) with ES x 45 minutes K3-HT3 SI4-BL60 with ES x 45 minutes BL 10, SP 6, ST 36, LR 3, LI 4, BL 59, omega 2, Cooper men Patient tolerated procedure well

## 2025-07-10 NOTE — HISTORY OF PRESENT ILLNESS
[Lower back] : lower back [10] : 10 [8] : 8 [Radiating] : radiating [Throbbing] : throbbing [Constant] : constant [Household chores] : household chores [Leisure] : leisure [Injection therapy] : injection therapy [Standing] : standing [Walking] : walking [Retired] : Work status: retired [FreeTextEntry1] : 7/10/2025 - Patient presents today for a FUV after bilateral L4-L5, L5-S1 facet joint MBB on 2025. Patient reports greater than 60% relief of her axial low back pain the day of the injection followed by return to baseline.  Unfortunately she has experienced a return of radicular/claudication pain.  This is her primary concern at today's visit.  2025 - Patient presents today for a FUV after bilateral L4-L5, L5-S1 facet joint MBB on 2025. Patient reports >50% relief of axial low back pain the day of the procedure with a gradual return of pain to baseline the following day.  Patient had a positive response to the nerve block and wishes to proceed with a repeat at this time.  Denies any alarm signs or changes in medical history since last visit.  Patient continues acupuncture regularly and reports neck TPIs yesterday which have not provided relief of her headaches as of yet. She is considering a retrial of Botox for management in the near future.   6/3/2025 - Patient presents for FUV after a C7-T1 DARRELL on 2025. Patient denies any meaningful relief following the procedure. Her symptoms remain stable since her prior visit; she continues to have neck pain traveling to the back of her head.  She denies any radiation of pain into the upper extremities.  She also continues to have axial low back pain. She reports difficulty sleeping due to pain. She continues to follow up with neurology for her neck pain and headaches.  Had neurology consultation with Dr. Mas.  She started taking Trazadone per her Internist without significant benefit.  2025 - Patient presents for 4-week FUV.  Patient reports she continues to have low back pain. However, she reports her neck pain is now her predominant concern. She has pain in her neck traveling up to the back of her head and bilateral traps, associated with headaches. She has tried Botox with an outside neurologist, without benefit. She continues PT and acupuncture. Patient reports she followed up with Dr. Kimball who indicated she is a surgical candidate; however, she defers surgical intervention at this time. Of note, patient is following up with Dr. Nguyen for right knee pain; she reports she fell a few days ago onto her knee. She is taking Meloxicam PRN, Cymbalta daily, and Aspirin 81 mg prophylactically.  2025 - Patient presents for FUV after a L5-S1 LESI on 3/7/2025.  Patient reports she can walk more easily after the injection. She continues to have some residual pain in her low back and right hip. She denies any groin pain. Her low back pain is her predominant concern and is exacerbated with any activity. She is still under the care of several subspecialists for various conditions, including neurology for chronic headaches. She tried Botox therapy as previously advised. She is planning to establish care with Dr. Jigar rivera for evaluation of her lumbar spine.  2025 - Patient presents for 8-week FUV. Patient reports multifocal pain once again.  Greatest concern today is in regard to her low back with radiation to the right lower extremity.  Feels that it is still currently manageable with a combination of physical therapy and acupuncture.  She is currently under the care of several subspecialist for various conditions including a neurologist for her chronic headaches.  Botox therapy was suggested, and she is contemplating this.  12/3/2024 - Patient presents for FUV after a L5-S1 LESI on 11/15/2024. Patient reports 65% relief of symptoms and improvement in functionality.  She reports she has not required medication since the procedure and is pleased with relief in her low back pain. She has been receiving acupuncture as well and finds that has provided significant relief to her low back pain.    Today however, her most bothersome area of pain today is her cervical pain and headaches.  She is currently undergoing treatment for vitamin B12 and vitamin D deficiencies.  She is interested in scheduling a repeat DARRELL with the hope of decreasing her pain and increasing her functionality and quality of life.   10/25/2024 - Patient presents for a FUV after a bilateral occipital nerve block with U/S guidance on 2024. Patient reports mild improvement in her headaches at this time however, she has been addressing her cervical spine and finds that has been contributing to her relief.  She was seen by neurology who determined the HAs were generating from her neck and she now has been in physical therapy 3 times/week for the past 14 weeks and will be initiating acupuncture in the next few weeks as well. Of note, patient established care with a new cardiologist and is now on a new medication regimen and reports better controlled hypertension.  Today. her most bothersome symptom is her low back pain with radiation to bilateral lower extremities. She reports burning, numbness and tingling to bilateral feet. Presents today to discuss next steps in her treatment plan for her low back pain with the hope of decreasing her pain and increasing her functionality and quality of life.   2024 - Patient presents for FUV after a bilateral occipital nerve block on 2024. Patient reports a significant reduction in the frequency and intensity of her headaches for about 3-4 days s/p occipital block.  Unfortunately, she experienced a return of symptoms essentially to baseline.  She does continue to have headaches from which she thinks could possibly related to uncontrolled hypertension.  She has been seeing cardiology and will follow up with neurology.  2024 - Patient presents for FUV after a L5-S1 LESI on 2024. Patient reports moderate reduction of her lower back and right leg pain following the epidural.  Unfortunately, patient was seen in the ER 3 days post procedure for what she describes as severe hypertension and ongoing headache.  She was evaluated and discharged home.  Patient has been dealing with headaches for quite some time now without definitive source.  Has seen her longtime neurologist Dr. Kellerman and recently had an evaluation with Dr. Salmon ([facility]) who ordered repeat imaging of the C/T/L spine as well as brain.  Headache severity is greatest at the bilateral occiput with radiation to the top of her head.  2024 - Patient presents for FUV regarding their lower back pain.  She was last seen approximately 6 months ago.  Patient reports sustained relief following her previous lumbar epidural steroid injection which lasted until approximately 1 month ago.  Now has a return of lower back with radiation down the posterior right leg stopping at the posterior calf.  Additionally, she reports a flareup of her peripheral neuropathy as per her treating neurologist.  A course of intravenous corticosteroid was suggested and completed about 5 weeks ago.  Takes Cymbalta 60 mg and Tylenol daily with no benefit; she has discontinued Lyrica due to no perceived benefit.  Of note she is undergoing further neurological workup and will be undergoing a spinal tap this week.  2023 - Patient presents for FUV after a L5-S1 LESI on 2023. Patient reports a complete resolution of pain s/p injection, she is pleased with the results of the epidural; she continues to take 100 mg of Lyrica BID.   10/26/2023 - Patient presents for FUV regarding their lower back pain.   Patient reports radiation to the right lower extremity extending to the foot.  Currently has titrated to a dose of Lyrica 200 mg daily which has reduced some of the "neuropathy sensations". She also complains of additional multifocal joint pain; greatest of which is in her right knee.  She sees Dr. Nguyen for this.  2023- Patient presents for FUV regarding their lower back pain. Patient had started taking Tramadol and felt sick with nausea and sweating days after using, she has since discontinued use; she no longer gets IV steroid infusions as well.  Still with complaints in the low back with radiation to the right lower extremity extending to the ankle/foot.  Worst pain is concentrated in the right lateral aspect of the calf. Greatest degree of difficulty with ambulation.  2023 - Patient presents for FUV regarding their lower back pain.  Patient did not start the [place]  financial constraints.  She is following with a neurologist for IV steroid infusions for tx of peripheral neuropathy.  Patient reports she gets right sided arm numbness/tingling and right foot paranesthesia's.  She is becoming frustrated by her multitude of pain issues.  She is dealing with concurrent neck, low back, knee, and neurological issues.  2023 - Patient presents to review lumbar spine MRI from 2023. The majority of the patient's pain is in the right foot, and occasionally the first digit on the left foot, there is no pain in the rest of the lower extremities, she has back pain, but it is not as prominent as the right foot pain.  Has seen neurology in the past and obtained EMG.  Planning for follow-up in upcoming week.  2023 - Patient presents for FUV after a L5-S1 LESI on 2023.  Patient reports overall 50% relief sustained following the injection.  Patient reports her pain returned to baseline after about 2 weeks. She complains of primarily axial lower back pain specifically with prolonged standing.  2023 - Patient presents for FUV after a right PM C7-T1 DARRELL on 2023. Patient reports significant pain reduction following the injection (60% reduction in pain).  She is pleased with her response.  Patient's main complaint today is her axial lower back pain that has radiation to the medial right thigh, she had taken a trip to [place] recently for a week where she did extensive walking and exacerbated her back pain; she can endure 5-10 minutes of standing before it becomes unbearable.  Has had previous neurosurgical evaluations and surgery was recommended however she defers.  2023 - Patient presents for reevaluation.  She was last seen approximately 6 months ago.  Patient to the office regarding her neck pain. Patient was referred by Dr. Arevalo. Patient was originally seen for her back in September. Patient was placed on MDP for her right upper extremity radicular pain with good relief. Patient complains of right rotation stiffness in regards to her neck. Patient here for possible interventional management of cervical region ridiculous.  Taking dual action Advil once sometimes twice daily.  2022 - The patient presents for initial evaluation regarding their low back pain. Patient was referred by Dr. Nguyen. Patient sustained a slip and fall in 2021 and landed on her buttocks. Significant flare-up in her lower back since fall.  Experiences numbness in her toes and fingertips get tingling sensation when she closes her fists. Patient has gotten trigger point injections with no relief. Leg symptoms improve when seated. Diclofenac provides relief, previously tried Celebrex and Gabapentin - no relief.   Injections: 1) C7-T1 DARRELL (2023, 2025) 2) L5-S1 LESI (prior DOS, 2024, 11/15/2024, 3/7/2025) 3) Bilateral occipital nerve block with U/S guidance - (2024, 2024)  4) Bilateral L4-L5, L5-S1 facet joint MBB (2025, 2025)  Pertinent Surgical History: N/A   Imagin) MRI Lumbar Spine (2024) - [facility]  2) MRI Thoracic Spine (2024) - [facility]  3) MRI Cervical Spine (2024)- [facility]  Electrodiagnostic Testin) Lower Extremity NCV/EMG - (2022) - [facility]  1. 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 left peroneal motor and the right peroneal motor nerves showed reduced amplitude and decreased conduction velocity. The left tibial motor nerve showed decreased conduction velocity. The right tibial motor nerve showed reduced amplitude. The left sural sensory and right sural sensory nerves were unremarkable.  3) Upper Extremity NCV/EMG - (22) - [facility]  1. very mild bilateral median nerve lesions at the wrists 2. very mild bilateral ulnar nerve lesion at the elbows.   Physician Disclaimer: I have personally reviewed and confirmed all HPI data with the patient. [] : Post Surgical Visit: no [FreeTextEntry7] : b/l LEGS [FreeTextEntry9] : acupuncture  [de-identified] :  MRI Lumbar Spine (6/12/2024) - [facility]

## 2025-07-10 NOTE — PHYSICAL EXAM
[de-identified] : Constitutional:   - No acute distress   - Obese    Neurological:   - normal mood and affect   - alert and oriented x 3       Cardiovascular:   - grossly normal  Lumbar Spine Exam:  Inspection: erythema (-) ecchymosis (-) rashes (-) alignment: no scoliosis  Palpation: paraspinal tenderness:                    L (-); R (-) thoracic paraspinal tenderness:       L (-); R (-) sciatic nerve tenderness :                L (-); R (-) SI joint tenderness:                          L (-); R (-) GTB tenderness:                             L (-); R (-)  ROM: WNL with mild stiffness Pain with extremes of extension and flexion  Strength: Right/Left Hip Flexion: (5/5) (5/5) Quadriceps: (5/5) (5/5) Hamstrings: (5/5) (5/5) Ankle Dorsiflexion: (5/5) (5/5) EHL: (5/5) (5/5) Ankle Plantarflexion: (5/5) (5/5)  Special Tests: SLR:                 R (=); L (=) Facet loading:  R (+); L (+) WILLIAN test:     R (n/a); L (n/a) Tight Hamstrings R (+); L (+)  Neurologic: Light touch intact throughout LE  Reflexes normal and symmetric  Gait: antalgic gait, unsteady  ambulates with assistance of walker  ------------------------------------------------------------------------------ Cervical Spine Exam:   Inspection:   erythema (-)   ecchymosis (-)   rashes (-)    Palpation:                                                    Cervical paraspinal tenderness:         R (+); L (+)  Upper trapezius tenderness:              R (-); L (-)  Rhomboids tenderness:                      R (-); L (-)  Occipital Ridge:                                   R (-); L (-)  Supraspinatus tenderness:                 R (-); L (-)   ROM:   Reduced ROM all planes; crepitus throughout ROM testing pain throughout range of motion testing  Strength Testing:              Deltoid                           R (5/5); L (5/5)  Biceps:                          R (5/5); L (5/5)  Triceps:                         R (5/5); L (5/5)  Finger Abductors:         R (5/5); L (5/5)  Grasp:                           R (5/5); L (5/5)   Special Testing:  Spurling Test:                  R (-); L (-)  Facet load test:               R (+); L (+)   Neuro:  SILT throughout right upper extremity  SILT throughout left upper extremity   Reflexes:  Biceps   -           R (2+); L (2+)  Triceps  -           R (2+); L (2+)  Brachioradialis- R (2+); L (2+)     No ankle clonus

## 2025-07-10 NOTE — PHYSICAL EXAM
[de-identified] : Constitutional:   - No acute distress   - Obese    Neurological:   - normal mood and affect   - alert and oriented x 3       Cardiovascular:   - grossly normal  Lumbar Spine Exam:  Inspection: erythema (-) ecchymosis (-) rashes (-) alignment: no scoliosis  Palpation: paraspinal tenderness:                    L (-); R (-) thoracic paraspinal tenderness:       L (-); R (-) sciatic nerve tenderness :                L (-); R (-) SI joint tenderness:                          L (-); R (-) GTB tenderness:                             L (-); R (-)  ROM: WNL with mild stiffness Pain with extremes of extension and flexion  Strength: Right/Left Hip Flexion: (5/5) (5/5) Quadriceps: (5/5) (5/5) Hamstrings: (5/5) (5/5) Ankle Dorsiflexion: (5/5) (5/5) EHL: (5/5) (5/5) Ankle Plantarflexion: (5/5) (5/5)  Special Tests: SLR:                 R (=); L (=) Facet loading:  R (+); L (+) WILLIAN test:     R (n/a); L (n/a) Tight Hamstrings R (+); L (+)  Neurologic: Light touch intact throughout LE  Reflexes normal and symmetric  Gait: antalgic gait, unsteady  ambulates with assistance of walker  ------------------------------------------------------------------------------ Cervical Spine Exam:   Inspection:   erythema (-)   ecchymosis (-)   rashes (-)    Palpation:                                                    Cervical paraspinal tenderness:         R (+); L (+)  Upper trapezius tenderness:              R (-); L (-)  Rhomboids tenderness:                      R (-); L (-)  Occipital Ridge:                                   R (-); L (-)  Supraspinatus tenderness:                 R (-); L (-)   ROM:   Reduced ROM all planes; crepitus throughout ROM testing pain throughout range of motion testing  Strength Testing:              Deltoid                           R (5/5); L (5/5)  Biceps:                          R (5/5); L (5/5)  Triceps:                         R (5/5); L (5/5)  Finger Abductors:         R (5/5); L (5/5)  Grasp:                           R (5/5); L (5/5)   Special Testing:  Spurling Test:                  R (-); L (-)  Facet load test:               R (+); L (+)   Neuro:  SILT throughout right upper extremity  SILT throughout left upper extremity   Reflexes:  Biceps   -           R (2+); L (2+)  Triceps  -           R (2+); L (2+)  Brachioradialis- R (2+); L (2+)     No ankle clonus

## 2025-07-10 NOTE — HISTORY OF PRESENT ILLNESS
[Lower back] : lower back [10] : 10 [8] : 8 [Radiating] : radiating [Throbbing] : throbbing [Constant] : constant [Household chores] : household chores [Leisure] : leisure [Injection therapy] : injection therapy [Standing] : standing [Walking] : walking [Retired] : Work status: retired [FreeTextEntry1] : 7/10/2025 - Patient presents today for a FUV after bilateral L4-L5, L5-S1 facet joint MBB on 2025. Patient reports greater than 60% relief of her axial low back pain the day of the injection followed by return to baseline.  Unfortunately she has experienced a return of radicular/claudication pain.  This is her primary concern at today's visit.  2025 - Patient presents today for a FUV after bilateral L4-L5, L5-S1 facet joint MBB on 2025. Patient reports >50% relief of axial low back pain the day of the procedure with a gradual return of pain to baseline the following day.  Patient had a positive response to the nerve block and wishes to proceed with a repeat at this time.  Denies any alarm signs or changes in medical history since last visit.  Patient continues acupuncture regularly and reports neck TPIs yesterday which have not provided relief of her headaches as of yet. She is considering a retrial of Botox for management in the near future.   6/3/2025 - Patient presents for FUV after a C7-T1 DARRELL on 2025. Patient denies any meaningful relief following the procedure. Her symptoms remain stable since her prior visit; she continues to have neck pain traveling to the back of her head.  She denies any radiation of pain into the upper extremities.  She also continues to have axial low back pain. She reports difficulty sleeping due to pain. She continues to follow up with neurology for her neck pain and headaches.  Had neurology consultation with Dr. Mas.  She started taking Trazadone per her Internist without significant benefit.  2025 - Patient presents for 4-week FUV.  Patient reports she continues to have low back pain. However, she reports her neck pain is now her predominant concern. She has pain in her neck traveling up to the back of her head and bilateral traps, associated with headaches. She has tried Botox with an outside neurologist, without benefit. She continues PT and acupuncture. Patient reports she followed up with Dr. Kimball who indicated she is a surgical candidate; however, she defers surgical intervention at this time. Of note, patient is following up with Dr. Nguyen for right knee pain; she reports she fell a few days ago onto her knee. She is taking Meloxicam PRN, Cymbalta daily, and Aspirin 81 mg prophylactically.  2025 - Patient presents for FUV after a L5-S1 LESI on 3/7/2025.  Patient reports she can walk more easily after the injection. She continues to have some residual pain in her low back and right hip. She denies any groin pain. Her low back pain is her predominant concern and is exacerbated with any activity. She is still under the care of several subspecialists for various conditions, including neurology for chronic headaches. She tried Botox therapy as previously advised. She is planning to establish care with Dr. Jigar rivera for evaluation of her lumbar spine.  2025 - Patient presents for 8-week FUV. Patient reports multifocal pain once again.  Greatest concern today is in regard to her low back with radiation to the right lower extremity.  Feels that it is still currently manageable with a combination of physical therapy and acupuncture.  She is currently under the care of several subspecialist for various conditions including a neurologist for her chronic headaches.  Botox therapy was suggested, and she is contemplating this.  12/3/2024 - Patient presents for FUV after a L5-S1 LESI on 11/15/2024. Patient reports 65% relief of symptoms and improvement in functionality.  She reports she has not required medication since the procedure and is pleased with relief in her low back pain. She has been receiving acupuncture as well and finds that has provided significant relief to her low back pain.    Today however, her most bothersome area of pain today is her cervical pain and headaches.  She is currently undergoing treatment for vitamin B12 and vitamin D deficiencies.  She is interested in scheduling a repeat DARRELL with the hope of decreasing her pain and increasing her functionality and quality of life.   10/25/2024 - Patient presents for a FUV after a bilateral occipital nerve block with U/S guidance on 2024. Patient reports mild improvement in her headaches at this time however, she has been addressing her cervical spine and finds that has been contributing to her relief.  She was seen by neurology who determined the HAs were generating from her neck and she now has been in physical therapy 3 times/week for the past 14 weeks and will be initiating acupuncture in the next few weeks as well. Of note, patient established care with a new cardiologist and is now on a new medication regimen and reports better controlled hypertension.  Today. her most bothersome symptom is her low back pain with radiation to bilateral lower extremities. She reports burning, numbness and tingling to bilateral feet. Presents today to discuss next steps in her treatment plan for her low back pain with the hope of decreasing her pain and increasing her functionality and quality of life.   2024 - Patient presents for FUV after a bilateral occipital nerve block on 2024. Patient reports a significant reduction in the frequency and intensity of her headaches for about 3-4 days s/p occipital block.  Unfortunately, she experienced a return of symptoms essentially to baseline.  She does continue to have headaches from which she thinks could possibly related to uncontrolled hypertension.  She has been seeing cardiology and will follow up with neurology.  2024 - Patient presents for FUV after a L5-S1 LESI on 2024. Patient reports moderate reduction of her lower back and right leg pain following the epidural.  Unfortunately, patient was seen in the ER 3 days post procedure for what she describes as severe hypertension and ongoing headache.  She was evaluated and discharged home.  Patient has been dealing with headaches for quite some time now without definitive source.  Has seen her longtime neurologist Dr. Kellerman and recently had an evaluation with Dr. Salmon ([facility]) who ordered repeat imaging of the C/T/L spine as well as brain.  Headache severity is greatest at the bilateral occiput with radiation to the top of her head.  2024 - Patient presents for FUV regarding their lower back pain.  She was last seen approximately 6 months ago.  Patient reports sustained relief following her previous lumbar epidural steroid injection which lasted until approximately 1 month ago.  Now has a return of lower back with radiation down the posterior right leg stopping at the posterior calf.  Additionally, she reports a flareup of her peripheral neuropathy as per her treating neurologist.  A course of intravenous corticosteroid was suggested and completed about 5 weeks ago.  Takes Cymbalta 60 mg and Tylenol daily with no benefit; she has discontinued Lyrica due to no perceived benefit.  Of note she is undergoing further neurological workup and will be undergoing a spinal tap this week.  2023 - Patient presents for FUV after a L5-S1 LESI on 2023. Patient reports a complete resolution of pain s/p injection, she is pleased with the results of the epidural; she continues to take 100 mg of Lyrica BID.   10/26/2023 - Patient presents for FUV regarding their lower back pain.   Patient reports radiation to the right lower extremity extending to the foot.  Currently has titrated to a dose of Lyrica 200 mg daily which has reduced some of the "neuropathy sensations". She also complains of additional multifocal joint pain; greatest of which is in her right knee.  She sees Dr. Nguyen for this.  2023- Patient presents for FUV regarding their lower back pain. Patient had started taking Tramadol and felt sick with nausea and sweating days after using, she has since discontinued use; she no longer gets IV steroid infusions as well.  Still with complaints in the low back with radiation to the right lower extremity extending to the ankle/foot.  Worst pain is concentrated in the right lateral aspect of the calf. Greatest degree of difficulty with ambulation.  2023 - Patient presents for FUV regarding their lower back pain.  Patient did not start the [place]  financial constraints.  She is following with a neurologist for IV steroid infusions for tx of peripheral neuropathy.  Patient reports she gets right sided arm numbness/tingling and right foot paranesthesia's.  She is becoming frustrated by her multitude of pain issues.  She is dealing with concurrent neck, low back, knee, and neurological issues.  2023 - Patient presents to review lumbar spine MRI from 2023. The majority of the patient's pain is in the right foot, and occasionally the first digit on the left foot, there is no pain in the rest of the lower extremities, she has back pain, but it is not as prominent as the right foot pain.  Has seen neurology in the past and obtained EMG.  Planning for follow-up in upcoming week.  2023 - Patient presents for FUV after a L5-S1 LESI on 2023.  Patient reports overall 50% relief sustained following the injection.  Patient reports her pain returned to baseline after about 2 weeks. She complains of primarily axial lower back pain specifically with prolonged standing.  2023 - Patient presents for FUV after a right PM C7-T1 DARRELL on 2023. Patient reports significant pain reduction following the injection (60% reduction in pain).  She is pleased with her response.  Patient's main complaint today is her axial lower back pain that has radiation to the medial right thigh, she had taken a trip to [place] recently for a week where she did extensive walking and exacerbated her back pain; she can endure 5-10 minutes of standing before it becomes unbearable.  Has had previous neurosurgical evaluations and surgery was recommended however she defers.  2023 - Patient presents for reevaluation.  She was last seen approximately 6 months ago.  Patient to the office regarding her neck pain. Patient was referred by Dr. Arevalo. Patient was originally seen for her back in September. Patient was placed on MDP for her right upper extremity radicular pain with good relief. Patient complains of right rotation stiffness in regards to her neck. Patient here for possible interventional management of cervical region ridiculous.  Taking dual action Advil once sometimes twice daily.  2022 - The patient presents for initial evaluation regarding their low back pain. Patient was referred by Dr. Nguyen. Patient sustained a slip and fall in 2021 and landed on her buttocks. Significant flare-up in her lower back since fall.  Experiences numbness in her toes and fingertips get tingling sensation when she closes her fists. Patient has gotten trigger point injections with no relief. Leg symptoms improve when seated. Diclofenac provides relief, previously tried Celebrex and Gabapentin - no relief.   Injections: 1) C7-T1 DARRELL (2023, 2025) 2) L5-S1 LESI (prior DOS, 2024, 11/15/2024, 3/7/2025) 3) Bilateral occipital nerve block with U/S guidance - (2024, 2024)  4) Bilateral L4-L5, L5-S1 facet joint MBB (2025, 2025)  Pertinent Surgical History: N/A   Imagin) MRI Lumbar Spine (2024) - [facility]  2) MRI Thoracic Spine (2024) - [facility]  3) MRI Cervical Spine (2024)- [facility]  Electrodiagnostic Testin) Lower Extremity NCV/EMG - (2022) - [facility]  1. 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 left peroneal motor and the right peroneal motor nerves showed reduced amplitude and decreased conduction velocity. The left tibial motor nerve showed decreased conduction velocity. The right tibial motor nerve showed reduced amplitude. The left sural sensory and right sural sensory nerves were unremarkable.  3) Upper Extremity NCV/EMG - (22) - [facility]  1. very mild bilateral median nerve lesions at the wrists 2. very mild bilateral ulnar nerve lesion at the elbows.   Physician Disclaimer: I have personally reviewed and confirmed all HPI data with the patient. [] : Post Surgical Visit: no [FreeTextEntry7] : b/l LEGS [FreeTextEntry9] : acupuncture  [de-identified] :  MRI Lumbar Spine (6/12/2024) - [facility]

## 2025-07-10 NOTE — ASSESSMENT
[FreeTextEntry1] : A discussion regarding available pain management treatment options occurred with the patient. These included interventional, rehabilitative, pharmacological, and alternative modalities. We will proceed with the following:  Interventional treatment options: - Proceed with right PM L5-S1 LESI (80 mg Kenalog) with fluoroscopic guidance - Proceed with bilateral L4-L5, L5-S1 facet joint RFA with fluoroscopic guidance - Patient has been counseled on several occasions regarding limitations of corticosteroid administration; we have suggested that she reserve injection therapies for episodes of severe pain exacerbations; she verbalizes understanding of this - Previously discussed realistic expectations of interventional pain modalities in the setting of overall degenerative spinal pathology - see additional instructions below  Rehabilitative options: - Completed extensive physical therapy for the lumbar and cervical spine with variable relief - encouraged participation in active HEP as tolerated  Medication based treatment options: - continue Tylenol 500-1000 mg up to TID on PRN basis - Continue meloxicam 7.5-15 mg daily as needed; counseled patient to discuss consistent NSAID use with her cardiologist - Continue methocarbamol 500 mg up to BID as needed for spasm - Continue Cymbalta 90 mg daily, prescribed by neurology - Failed previous trials of Lyrica and gabapentin - see additional instructions below  Complementary treatment options: - Weight management and lifestyle modifications discussed - Continue acupuncture therapy as directed - Dr. Valentino Stevenson  Behavioral pain treatment options: - Patient has been advised to pursue psychology evaluation; referral provided at prior visit - she has continued to defer - Patient was counseled regarding alternative coping strategies to deal with her chronic multifocal pain  Additional treatment recommendations as follows: - Follow-up with orthopedic surgical subspecialists as directed - Patient was counseled as to red flag signs and when to seek urgent care - Follow-up with neurology (Dr. Mas) as directed regarding chronic headaches - Follow up 4-6 weeks post RFA for assessment of efficacy and further treatment recommendations  We have discussed the risks, benefits, and alternatives NSAID therapy including but not limited to the risk of bleeding, thrombosis, gastric mucosal irritation/ulceration, allergic reaction and kidney dysfunction; the patient verbalizes an understanding.  The risks, benefits and alternatives of the proposed procedure were explained in detail with the patient. The risks outlined include but are not limited to infection, bleeding, post- dural puncture headache, nerve injury, a temporary increase in pain, failure to resolve symptoms, need for future interventions, allergic reaction, and possible elevation of blood sugar in diabetics if using corticosteroid.  All questions were answered to patient's apparent satisfaction, and he/she verbalized an understanding.  Patient has lumbosacral axial pain that is consistent with facet joint pathology.  A diagnostic temporary block with local anesthetic of the medial branch was performed and has resulted in at least a 50% reduction in pain for the duration of the specific local anesthetic effect.  The pain is not radicular and there is absence of nerve root compression.  There is no prior spinal fusion surgery at the level targeted.  The pain has failed to respond to three months of conservative therapy.

## 2025-07-15 NOTE — PROCEDURE
[de-identified] : Acupuncture treatment: Baldry technique with multiple needle placement to the cervical paraspinal, lumbar paraspinal and gluteal musculature with UV lamp x 45 minutes Paralumbar chain (bladder meridian) with ES x 45 minutes K3-HT3 SI4-BL60 with ES x 45 minutes BL 10, SP 6, ST 36, LR 3, LI 4, BL 59, omega 2, Cooper men Patient tolerated procedure well

## 2025-07-18 NOTE — PROCEDURE
[de-identified] : Acupuncture treatment: Baldry technique with multiple needle placement to the cervical paraspinal, lumbar paraspinal and gluteal musculature with UV lamp x 45 minutes Paralumbar chain (bladder meridian) with ES x 45 minutes K3-HT3 SI4-BL60 with ES x 45 minutes BL 10, SP 6, ST 36, LR 3, LI 4, BL 59, omega 2, Cooper men Patient tolerated procedure well

## 2025-07-22 NOTE — PROCEDURE
[de-identified] : Acupuncture treatment: Baldry technique with multiple needle placement to the cervical paraspinal, lumbar paraspinal and gluteal musculature with UV lamp x 45 minutes Paralumbar chain (bladder meridian) with ES x 45 minutes K3-HT3 SI4-BL60 with ES x 45 minutes BL 10, SP 6, ST 36, LR 3, LI 4, BL 59, omega 2, Cooper men Patient tolerated procedure well

## 2025-07-22 NOTE — PROCEDURE
[de-identified] : Acupuncture treatment: Baldry technique with multiple needle placement to the cervical paraspinal, lumbar paraspinal and gluteal musculature with UV lamp x 45 minutes Paralumbar chain (bladder meridian) with ES x 45 minutes K3-HT3 SI4-BL60 with ES x 45 minutes BL 10, SP 6, ST 36, LR 3, LI 4, BL 59, omega 2, Cooper men Patient tolerated procedure well

## 2025-07-23 NOTE — PROCEDURE
[FreeTextEntry3] : Date of Service: 07/23/2025   Account: [id number]  Patient: ISMAEL WALDEN   YOB: 1950  Age: 74 years  Surgeon:      Alverto Barney DO  Assistant:    None  Pre-Operative Diagnosis:         Lumbosacral Radiculitis (M54.17)  Post Operative Diagnosis:       Lumbosacral Radiculitis (M54.17)     Procedure:             Lumbar interlaminar (L5-S1) epidural steroid injection under fluoroscopic guidance  This procedure was carried out using fluoroscopic guidance.  The risks and benefits of the procedure were discussed extensively with the patient.  The consent of the patient was obtained, and the following procedure was performed.  A timeout was performed with all essential staff present and the site and side were verified.  The patient was placed in the prone position with a pillow under the abdomen to minimize the lumbar lordosis.  The lumbar area was prepped and draped in a sterile fashion.  Under A/P view with slight cephalad-caudad angulation, the L5-S1 interspace was identified and marked.  Using sterile technique, the superficial skin was anesthetized with 1% Lidocaine.  A 20-gauge Tuohy needle was advanced into the epidural space under fluoroscopy using loss of resistance at the L5-S1 level.  After negative aspiration for heme or CSF, an epidurogram was obtained in the A/P and lateral fluoroscopic views using 2-3 cc of Omnipaque contrast confirming epidural placement of the needle.  After this, 5 cc of a mixture of preservative free normal saline and 80 mg of Kenalog were injected into the epidural space.   Vital signs remained normal throughout the procedure.  The patient tolerated the procedure well.  There were no immediate complications from the performed procedure.  The patient was instructed to apply ice over the injection sites for twenty minutes every two hours for the next 24 hours.  Disposition:      1. The patient was advised to F/U in 1-2 weeks to assess the response to the injection.      2. The patient was also instructed to contact me immediately if there were any concerns related to the procedure performed.

## 2025-07-24 NOTE — PROCEDURE
[de-identified] : Acupuncture treatment: Baldry technique with multiple needle placement to the cervical paraspinal, lumbar paraspinal and gluteal musculature with UV lamp x 45 minutes Paralumbar chain (bladder meridian) with ES x 45 minutes K3-HT3 SI4-BL60 with ES x 45 minutes BL 10, SP 6, ST 36, LR 3, LI 4, BL 59, omega 2, Cooper men Patient tolerated procedure well

## 2025-07-29 NOTE — PROCEDURE
[de-identified] : Acupuncture treatment: [place] technique with multiple needle placement to the cervical paraspinal, lumbar paraspinal and gluteal musculature with UV lamp x 45 minutes Paralumbar chain (bladder meridian) with ES x 45 minutes K3-HT3 SI4-BL60 with ES x 45 minutes BL 10, SP 6, ST 36, LR 3, LI 4, BL 59, omega 2, Cooper men Patient tolerated procedure well